# Patient Record
Sex: MALE | Race: WHITE | Employment: OTHER | URBAN - NONMETROPOLITAN AREA
[De-identification: names, ages, dates, MRNs, and addresses within clinical notes are randomized per-mention and may not be internally consistent; named-entity substitution may affect disease eponyms.]

---

## 2019-02-06 ENCOUNTER — OFFICE VISIT (OUTPATIENT)
Dept: FAMILY MEDICINE CLINIC | Age: 69
End: 2019-02-06
Payer: MEDICARE

## 2019-02-06 VITALS
HEIGHT: 74 IN | SYSTOLIC BLOOD PRESSURE: 164 MMHG | DIASTOLIC BLOOD PRESSURE: 118 MMHG | RESPIRATION RATE: 14 BRPM | WEIGHT: 247 LBS | BODY MASS INDEX: 31.7 KG/M2 | HEART RATE: 88 BPM | OXYGEN SATURATION: 98 %

## 2019-02-06 DIAGNOSIS — I10 ESSENTIAL HYPERTENSION: Primary | ICD-10-CM

## 2019-02-06 PROCEDURE — 1036F TOBACCO NON-USER: CPT | Performed by: FAMILY MEDICINE

## 2019-02-06 PROCEDURE — 99204 OFFICE O/P NEW MOD 45 MIN: CPT | Performed by: FAMILY MEDICINE

## 2019-02-06 PROCEDURE — 1123F ACP DISCUSS/DSCN MKR DOCD: CPT | Performed by: FAMILY MEDICINE

## 2019-02-06 PROCEDURE — G8484 FLU IMMUNIZE NO ADMIN: HCPCS | Performed by: FAMILY MEDICINE

## 2019-02-06 PROCEDURE — 4040F PNEUMOC VAC/ADMIN/RCVD: CPT | Performed by: FAMILY MEDICINE

## 2019-02-06 PROCEDURE — G8417 CALC BMI ABV UP PARAM F/U: HCPCS | Performed by: FAMILY MEDICINE

## 2019-02-06 PROCEDURE — 3017F COLORECTAL CA SCREEN DOC REV: CPT | Performed by: FAMILY MEDICINE

## 2019-02-06 PROCEDURE — 1101F PT FALLS ASSESS-DOCD LE1/YR: CPT | Performed by: FAMILY MEDICINE

## 2019-02-06 PROCEDURE — G8427 DOCREV CUR MEDS BY ELIG CLIN: HCPCS | Performed by: FAMILY MEDICINE

## 2019-02-06 RX ORDER — ASPIRIN 81 MG/1
81 TABLET ORAL DAILY
Qty: 30 TABLET | Refills: 5 | COMMUNITY
Start: 2019-02-06 | End: 2019-02-20 | Stop reason: ALTCHOICE

## 2019-02-06 RX ORDER — LISINOPRIL AND HYDROCHLOROTHIAZIDE 25; 20 MG/1; MG/1
1 TABLET ORAL DAILY
Qty: 30 TABLET | Refills: 1 | Status: SHIPPED | OUTPATIENT
Start: 2019-02-06 | End: 2019-02-20 | Stop reason: SDUPTHER

## 2019-02-06 ASSESSMENT — ENCOUNTER SYMPTOMS
WHEEZING: 0
SHORTNESS OF BREATH: 0
CONSTIPATION: 0
DIARRHEA: 0
SORE THROAT: 0
EYE DISCHARGE: 0
RHINORRHEA: 0
NAUSEA: 0
COUGH: 0

## 2019-02-06 ASSESSMENT — PATIENT HEALTH QUESTIONNAIRE - PHQ9
1. LITTLE INTEREST OR PLEASURE IN DOING THINGS: 0
SUM OF ALL RESPONSES TO PHQ9 QUESTIONS 1 & 2: 0
SUM OF ALL RESPONSES TO PHQ QUESTIONS 1-9: 0
2. FEELING DOWN, DEPRESSED OR HOPELESS: 0
SUM OF ALL RESPONSES TO PHQ QUESTIONS 1-9: 0

## 2019-02-07 ENCOUNTER — TELEPHONE (OUTPATIENT)
Dept: FAMILY MEDICINE CLINIC | Age: 69
End: 2019-02-07

## 2019-02-07 ENCOUNTER — HOSPITAL ENCOUNTER (EMERGENCY)
Age: 69
Discharge: HOME | End: 2019-02-08
Attending: FAMILY MEDICINE
Payer: MEDICARE

## 2019-02-07 DIAGNOSIS — N28.9 RENAL INSUFFICIENCY: ICD-10-CM

## 2019-02-07 DIAGNOSIS — I10 UNCONTROLLED HYPERTENSION: Primary | ICD-10-CM

## 2019-02-07 LAB
ALBUMIN SERPL-MCNC: 4.4 GM/DL (ref 3.4–5)
ALP BLD-CCNC: 75 U/L (ref 46–116)
ALT SERPL-CCNC: 21 U/L (ref 14–63)
ANION GAP: 14 MEQ/L (ref 8–16)
AST SERPL-CCNC: 13 U/L (ref 15–37)
BASOPHILS # BLD: 0.9 % (ref 0–3)
BILIRUB SERPL-MCNC: 0.9 MG/DL (ref 0.2–1)
BUN BLDV-MCNC: 20 MG/DL (ref 7–18)
CHLORIDE BLD-SCNC: 101 MEQ/L (ref 98–107)
CO2: 25 MEQ/L (ref 21–32)
CREAT SERPL-MCNC: 1.5 MG/DL (ref 0.6–1.3)
EKG ATRIAL RATE: 82 BPM
EKG P AXIS: 55 DEGREES
EKG P-R INTERVAL: 156 MS
EKG Q-T INTERVAL: 398 MS
EKG QRS DURATION: 104 MS
EKG QTC CALCULATION (BAZETT): 464 MS
EKG R AXIS: 38 DEGREES
EKG T AXIS: 29 DEGREES
EKG VENTRICULAR RATE: 82 BPM
EOSINOPHILS RELATIVE PERCENT: 2.8 % (ref 0–4)
GFR, ESTIMATED: 49 ML/MIN/1.73M2
GLUCOSE BLD-MCNC: 123 MG/DL (ref 74–106)
HCT VFR BLD CALC: 49.1 % (ref 42–52)
HEMOGLOBIN: 16.6 GM/DL (ref 14–18)
LYMPHOCYTES # BLD: 22.8 % (ref 15–47)
MCH RBC QN AUTO: 28.7 PG (ref 27–31)
MCHC RBC AUTO-ENTMCNC: 33.8 GM/DL (ref 33–37)
MCV RBC AUTO: 85 FL (ref 80–94)
MONOCYTES: 10.5 % (ref 0–12)
PDW BLD-RTO: 13.1 % (ref 11.5–14.5)
PLATELET # BLD: 167 THOU/MM3 (ref 130–400)
PMV BLD AUTO: 7.4 FL (ref 7.4–10.4)
POC CALCIUM: 9.9 MG/DL (ref 8.5–10.1)
POTASSIUM SERPL-SCNC: 3.3 MEQ/L (ref 3.5–5.1)
RBC # BLD: 5.78 MILL/MM3 (ref 4.7–6.1)
SEGS: 63 % (ref 43–75)
SODIUM BLD-SCNC: 140 MEQ/L (ref 136–145)
TOTAL PROTEIN: 7.8 GM/DL (ref 6.4–8.2)
TROPONIN I: < 0.017 NG/ML (ref 0.02–0.05)
WBC # BLD: 5 THOU/MM3 (ref 4.8–10.8)

## 2019-02-07 PROCEDURE — 2500000003 HC RX 250 WO HCPCS: Performed by: FAMILY MEDICINE

## 2019-02-07 PROCEDURE — 99283 EMERGENCY DEPT VISIT LOW MDM: CPT

## 2019-02-07 PROCEDURE — 96374 THER/PROPH/DIAG INJ IV PUSH: CPT

## 2019-02-07 PROCEDURE — 85025 COMPLETE CBC W/AUTO DIFF WBC: CPT

## 2019-02-07 PROCEDURE — 84484 ASSAY OF TROPONIN QUANT: CPT

## 2019-02-07 PROCEDURE — 36415 COLL VENOUS BLD VENIPUNCTURE: CPT

## 2019-02-07 PROCEDURE — 93005 ELECTROCARDIOGRAM TRACING: CPT | Performed by: FAMILY MEDICINE

## 2019-02-07 PROCEDURE — 80053 COMPREHEN METABOLIC PANEL: CPT

## 2019-02-07 PROCEDURE — 2709999900 HC NON-CHARGEABLE SUPPLY

## 2019-02-07 RX ORDER — LABETALOL HYDROCHLORIDE 5 MG/ML
10 INJECTION, SOLUTION INTRAVENOUS ONCE
Status: COMPLETED | OUTPATIENT
Start: 2019-02-07 | End: 2019-02-07

## 2019-02-07 RX ADMIN — LABETALOL HYDROCHLORIDE 10 MG: 5 INJECTION INTRAVENOUS at 23:11

## 2019-02-08 VITALS
SYSTOLIC BLOOD PRESSURE: 202 MMHG | WEIGHT: 245 LBS | HEIGHT: 73 IN | OXYGEN SATURATION: 97 % | BODY MASS INDEX: 32.47 KG/M2 | TEMPERATURE: 98 F | RESPIRATION RATE: 22 BRPM | DIASTOLIC BLOOD PRESSURE: 109 MMHG | HEART RATE: 71 BPM

## 2019-02-08 PROCEDURE — 93010 ELECTROCARDIOGRAM REPORT: CPT | Performed by: INTERNAL MEDICINE

## 2019-02-08 ASSESSMENT — ENCOUNTER SYMPTOMS
NAUSEA: 0
CONSTIPATION: 0
ABDOMINAL PAIN: 0
DIARRHEA: 0
SINUS PRESSURE: 0
ABDOMINAL DISTENTION: 0
EYE DISCHARGE: 0
COUGH: 0
CHEST TIGHTNESS: 0
SHORTNESS OF BREATH: 0
WHEEZING: 0
EYE PAIN: 0
VOMITING: 0
STRIDOR: 0
PHOTOPHOBIA: 0
BACK PAIN: 0
SORE THROAT: 0
EYE REDNESS: 0
RHINORRHEA: 0

## 2019-02-11 ENCOUNTER — TELEPHONE (OUTPATIENT)
Dept: FAMILY MEDICINE CLINIC | Age: 69
End: 2019-02-11

## 2019-02-20 ENCOUNTER — OFFICE VISIT (OUTPATIENT)
Dept: FAMILY MEDICINE CLINIC | Age: 69
End: 2019-02-20
Payer: MEDICARE

## 2019-02-20 ENCOUNTER — TELEPHONE (OUTPATIENT)
Dept: FAMILY MEDICINE CLINIC | Age: 69
End: 2019-02-20

## 2019-02-20 VITALS
HEART RATE: 82 BPM | BODY MASS INDEX: 31 KG/M2 | RESPIRATION RATE: 16 BRPM | DIASTOLIC BLOOD PRESSURE: 92 MMHG | SYSTOLIC BLOOD PRESSURE: 168 MMHG | OXYGEN SATURATION: 95 % | WEIGHT: 235 LBS

## 2019-02-20 DIAGNOSIS — R06.02 SHORTNESS OF BREATH: ICD-10-CM

## 2019-02-20 DIAGNOSIS — I10 UNCONTROLLED HYPERTENSION: Primary | ICD-10-CM

## 2019-02-20 PROCEDURE — 4040F PNEUMOC VAC/ADMIN/RCVD: CPT | Performed by: FAMILY MEDICINE

## 2019-02-20 PROCEDURE — 3017F COLORECTAL CA SCREEN DOC REV: CPT | Performed by: FAMILY MEDICINE

## 2019-02-20 PROCEDURE — 99214 OFFICE O/P EST MOD 30 MIN: CPT | Performed by: FAMILY MEDICINE

## 2019-02-20 PROCEDURE — 1101F PT FALLS ASSESS-DOCD LE1/YR: CPT | Performed by: FAMILY MEDICINE

## 2019-02-20 PROCEDURE — G8484 FLU IMMUNIZE NO ADMIN: HCPCS | Performed by: FAMILY MEDICINE

## 2019-02-20 PROCEDURE — 1036F TOBACCO NON-USER: CPT | Performed by: FAMILY MEDICINE

## 2019-02-20 PROCEDURE — G8417 CALC BMI ABV UP PARAM F/U: HCPCS | Performed by: FAMILY MEDICINE

## 2019-02-20 PROCEDURE — G8427 DOCREV CUR MEDS BY ELIG CLIN: HCPCS | Performed by: FAMILY MEDICINE

## 2019-02-20 PROCEDURE — 1123F ACP DISCUSS/DSCN MKR DOCD: CPT | Performed by: FAMILY MEDICINE

## 2019-02-20 RX ORDER — AMLODIPINE BESYLATE 10 MG/1
10 TABLET ORAL DAILY
Qty: 90 TABLET | Refills: 1 | Status: SHIPPED | OUTPATIENT
Start: 2019-02-20 | End: 2020-03-04

## 2019-02-20 RX ORDER — LISINOPRIL AND HYDROCHLOROTHIAZIDE 25; 20 MG/1; MG/1
1 TABLET ORAL 2 TIMES DAILY
Qty: 60 TABLET | Refills: 3 | Status: SHIPPED | OUTPATIENT
Start: 2019-02-20 | End: 2019-06-24 | Stop reason: SDUPTHER

## 2019-02-20 ASSESSMENT — ENCOUNTER SYMPTOMS
NAUSEA: 0
ORTHOPNEA: 0
BACK PAIN: 1
SHORTNESS OF BREATH: 1
WHEEZING: 0
ABDOMINAL PAIN: 0
SORE THROAT: 0
RHINORRHEA: 0
CHEST TIGHTNESS: 1
CONSTIPATION: 0
BLURRED VISION: 0
DIARRHEA: 0
EYE DISCHARGE: 0
COUGH: 0

## 2019-03-05 ENCOUNTER — HOSPITAL ENCOUNTER (OUTPATIENT)
Dept: NON INVASIVE DIAGNOSTICS | Age: 69
Discharge: HOME | End: 2019-03-05
Payer: MEDICARE

## 2019-03-05 DIAGNOSIS — R06.02 SHORTNESS OF BREATH: ICD-10-CM

## 2019-03-05 LAB
LV EF: 60 %
LVEF MODALITY: NORMAL

## 2019-03-05 PROCEDURE — 93306 TTE W/DOPPLER COMPLETE: CPT

## 2019-03-07 ENCOUNTER — OFFICE VISIT (OUTPATIENT)
Dept: FAMILY MEDICINE CLINIC | Age: 69
End: 2019-03-07
Payer: MEDICARE

## 2019-03-07 VITALS
DIASTOLIC BLOOD PRESSURE: 80 MMHG | SYSTOLIC BLOOD PRESSURE: 128 MMHG | RESPIRATION RATE: 12 BRPM | WEIGHT: 233.4 LBS | BODY MASS INDEX: 30.79 KG/M2 | HEART RATE: 79 BPM | OXYGEN SATURATION: 98 %

## 2019-03-07 DIAGNOSIS — I10 ESSENTIAL HYPERTENSION: Primary | ICD-10-CM

## 2019-03-07 DIAGNOSIS — H61.23 BILATERAL IMPACTED CERUMEN: ICD-10-CM

## 2019-03-07 PROCEDURE — 99214 OFFICE O/P EST MOD 30 MIN: CPT | Performed by: FAMILY MEDICINE

## 2019-03-07 PROCEDURE — 4040F PNEUMOC VAC/ADMIN/RCVD: CPT | Performed by: FAMILY MEDICINE

## 2019-03-07 PROCEDURE — G8427 DOCREV CUR MEDS BY ELIG CLIN: HCPCS | Performed by: FAMILY MEDICINE

## 2019-03-07 PROCEDURE — 3017F COLORECTAL CA SCREEN DOC REV: CPT | Performed by: FAMILY MEDICINE

## 2019-03-07 PROCEDURE — 69210 REMOVE IMPACTED EAR WAX UNI: CPT | Performed by: FAMILY MEDICINE

## 2019-03-07 PROCEDURE — 1101F PT FALLS ASSESS-DOCD LE1/YR: CPT | Performed by: FAMILY MEDICINE

## 2019-03-07 PROCEDURE — G8484 FLU IMMUNIZE NO ADMIN: HCPCS | Performed by: FAMILY MEDICINE

## 2019-03-07 PROCEDURE — 1123F ACP DISCUSS/DSCN MKR DOCD: CPT | Performed by: FAMILY MEDICINE

## 2019-03-07 PROCEDURE — 1036F TOBACCO NON-USER: CPT | Performed by: FAMILY MEDICINE

## 2019-03-07 PROCEDURE — G8417 CALC BMI ABV UP PARAM F/U: HCPCS | Performed by: FAMILY MEDICINE

## 2019-03-07 ASSESSMENT — ENCOUNTER SYMPTOMS
RHINORRHEA: 0
SORE THROAT: 0
BLURRED VISION: 0
COUGH: 1
ORTHOPNEA: 0
CONSTIPATION: 0
DIARRHEA: 0
ABDOMINAL PAIN: 0
NAUSEA: 0
WHEEZING: 0
SHORTNESS OF BREATH: 0
EYE DISCHARGE: 0

## 2019-03-14 ENCOUNTER — HOSPITAL ENCOUNTER (OUTPATIENT)
Dept: NON INVASIVE DIAGNOSTICS | Age: 69
Discharge: HOME | End: 2019-03-14
Payer: MEDICARE

## 2019-03-14 VITALS — HEIGHT: 73 IN | WEIGHT: 234 LBS | BODY MASS INDEX: 31.01 KG/M2

## 2019-03-14 DIAGNOSIS — R06.02 SHORTNESS OF BREATH: ICD-10-CM

## 2019-03-14 DIAGNOSIS — I10 UNCONTROLLED HYPERTENSION: ICD-10-CM

## 2019-03-14 LAB
LV EF: 54 %
LVEF MODALITY: NORMAL

## 2019-03-14 PROCEDURE — 78452 HT MUSCLE IMAGE SPECT MULT: CPT

## 2019-03-14 PROCEDURE — A9500 TC99M SESTAMIBI: HCPCS | Performed by: NUCLEAR MEDICINE

## 2019-03-14 PROCEDURE — 93017 CV STRESS TEST TRACING ONLY: CPT

## 2019-03-14 PROCEDURE — 2709999900 HC NON-CHARGEABLE SUPPLY

## 2019-03-14 PROCEDURE — 3430000000 HC RX DIAGNOSTIC RADIOPHARMACEUTICAL: Performed by: NUCLEAR MEDICINE

## 2019-03-14 RX ADMIN — Medication 29.2 MILLICURIE: at 12:00

## 2019-03-14 RX ADMIN — Medication 8.7 MILLICURIE: at 10:39

## 2019-06-24 DIAGNOSIS — I10 UNCONTROLLED HYPERTENSION: ICD-10-CM

## 2019-06-24 RX ORDER — LISINOPRIL AND HYDROCHLOROTHIAZIDE 25; 20 MG/1; MG/1
1 TABLET ORAL 2 TIMES DAILY
Qty: 60 TABLET | Refills: 3 | Status: SHIPPED | OUTPATIENT
Start: 2019-06-24 | End: 2019-08-28

## 2019-08-28 ENCOUNTER — OFFICE VISIT (OUTPATIENT)
Dept: FAMILY MEDICINE CLINIC | Age: 69
End: 2019-08-28
Payer: MEDICARE

## 2019-08-28 VITALS
HEIGHT: 73 IN | HEART RATE: 74 BPM | OXYGEN SATURATION: 96 % | BODY MASS INDEX: 32.07 KG/M2 | RESPIRATION RATE: 16 BRPM | SYSTOLIC BLOOD PRESSURE: 132 MMHG | DIASTOLIC BLOOD PRESSURE: 78 MMHG | WEIGHT: 242 LBS

## 2019-08-28 DIAGNOSIS — I10 ESSENTIAL HYPERTENSION: ICD-10-CM

## 2019-08-28 DIAGNOSIS — Z00.00 ROUTINE GENERAL MEDICAL EXAMINATION AT A HEALTH CARE FACILITY: Primary | ICD-10-CM

## 2019-08-28 PROCEDURE — 4040F PNEUMOC VAC/ADMIN/RCVD: CPT | Performed by: FAMILY MEDICINE

## 2019-08-28 PROCEDURE — 3017F COLORECTAL CA SCREEN DOC REV: CPT | Performed by: FAMILY MEDICINE

## 2019-08-28 PROCEDURE — 1123F ACP DISCUSS/DSCN MKR DOCD: CPT | Performed by: FAMILY MEDICINE

## 2019-08-28 PROCEDURE — G0438 PPPS, INITIAL VISIT: HCPCS | Performed by: FAMILY MEDICINE

## 2019-08-28 RX ORDER — LOSARTAN POTASSIUM AND HYDROCHLOROTHIAZIDE 25; 100 MG/1; MG/1
1 TABLET ORAL DAILY
Qty: 30 TABLET | Refills: 3 | Status: SHIPPED | OUTPATIENT
Start: 2019-08-28 | End: 2020-03-04

## 2019-08-28 ASSESSMENT — LIFESTYLE VARIABLES
HOW OFTEN DURING THE LAST YEAR HAVE YOU FOUND THAT YOU WERE NOT ABLE TO STOP DRINKING ONCE YOU HAD STARTED: 0
HOW OFTEN DURING THE LAST YEAR HAVE YOU NEEDED AN ALCOHOLIC DRINK FIRST THING IN THE MORNING TO GET YOURSELF GOING AFTER A NIGHT OF HEAVY DRINKING: 0
HAVE YOU OR SOMEONE ELSE BEEN INJURED AS A RESULT OF YOUR DRINKING: 0
HOW OFTEN DO YOU HAVE A DRINK CONTAINING ALCOHOL: 3
HOW OFTEN DURING THE LAST YEAR HAVE YOU FAILED TO DO WHAT WAS NORMALLY EXPECTED FROM YOU BECAUSE OF DRINKING: 0
HOW OFTEN DO YOU HAVE SIX OR MORE DRINKS ON ONE OCCASION: 0
HAS A RELATIVE, FRIEND, DOCTOR, OR ANOTHER HEALTH PROFESSIONAL EXPRESSED CONCERN ABOUT YOUR DRINKING OR SUGGESTED YOU CUT DOWN: 0
AUDIT-C TOTAL SCORE: 3
HOW OFTEN DURING THE LAST YEAR HAVE YOU HAD A FEELING OF GUILT OR REMORSE AFTER DRINKING: 0
HOW MANY STANDARD DRINKS CONTAINING ALCOHOL DO YOU HAVE ON A TYPICAL DAY: 0
AUDIT TOTAL SCORE: 3
HOW OFTEN DURING THE LAST YEAR HAVE YOU BEEN UNABLE TO REMEMBER WHAT HAPPENED THE NIGHT BEFORE BECAUSE YOU HAD BEEN DRINKING: 0

## 2019-08-28 ASSESSMENT — PATIENT HEALTH QUESTIONNAIRE - PHQ9
SUM OF ALL RESPONSES TO PHQ QUESTIONS 1-9: 0
SUM OF ALL RESPONSES TO PHQ QUESTIONS 1-9: 0

## 2019-08-28 NOTE — PROGRESS NOTES
auscultation bilaterally- no wheezes, rales or rhonchi, normal air movement, no respiratory distress  Cardiovascular: normal rate, normal S1 and S2, no gallops and intact distal pulses    Patient's complete Health Risk Assessment and screening values have been reviewed and are found in Flowsheets. The following problems were reviewed today and where indicated follow up appointments were made and/or referrals ordered. Positive Risk Factor Screenings with Interventions:     Cognitive: Words recalled: 2 Words Recalled  Total Score Interpretation: Positive Mini-Cog  Cognitive Impairment Interventions:  · Patient declines any further evaluation/treatment for cognitive impairment    Health Habits/Nutrition:  Health Habits/Nutrition  Do you exercise for at least 20 minutes 2-3 times per week?: (!) No  Have you lost any weight without trying in the past 3 months?: No  Do you eat fewer than 2 meals per day?: No  Have you seen a dentist within the past year?: Yes  Body mass index is 31.93 kg/m². Health Habits/Nutrition Interventions:  · Inadequate physical activity:  educational materials provided to promote increased physical activity    Safety:  Safety  Do you have working smoke detectors?: Yes  Have all throw rugs been removed or fastened?: (!) No  Do you have non-slip mats or surfaces in all bathtubs/showers?: (!) No  Do all of your stairways have a railing or banister?: Yes  Are your doorways, halls and stairs free of clutter?: Yes  Do you always fasten your seatbelt when you are in a car?: Yes  Safety Interventions:  · Patient declines any further evaluation/treatment for this issue    Personalized Preventive Plan   Current Health Maintenance Status    There is no immunization history on file for this patient.      Health Maintenance   Topic Date Due    Hepatitis C screen  1950    DTaP/Tdap/Td vaccine (1 - Tdap) 08/05/1969    Lipid screen  08/05/1990    Diabetes screen  08/05/1990    Shingles Vaccine (1 of 2) 08/05/2000    Colon cancer screen colonoscopy  08/05/2000    Annual Wellness Visit (AWV)  08/05/2013    Pneumococcal 65+ years Vaccine (1 of 2 - PCV13) 08/05/2015    Flu vaccine (1) 09/01/2019    Potassium monitoring  02/07/2020    Creatinine monitoring  02/07/2020     Recommendations for Preventive Services Due: see orders and patient instructions/AVS.  . Recommended screening schedule for the next 5-10 years is provided to the patient in written form: see Patient Instructions/AVS.    Matha Merlin was seen today for medicare awv, cough and other. Diagnoses and all orders for this visit:    Routine general medical examination at a health care facility    Essential hypertension  -     losartan-hydrochlorothiazide (HYZAAR) 100-25 MG per tablet; Take 1 tablet by mouth daily        will change lisinopril/hctz to losartan hctz to help relieve cough  bp controlled  Patient delcines labs, and screening recommendations at this time will consider health [facility] labs.

## 2019-12-04 DIAGNOSIS — I10 UNCONTROLLED HYPERTENSION: ICD-10-CM

## 2019-12-05 RX ORDER — LISINOPRIL AND HYDROCHLOROTHIAZIDE 25; 20 MG/1; MG/1
TABLET ORAL
Qty: 240 TABLET | Refills: 0 | Status: SHIPPED | OUTPATIENT
Start: 2019-12-05 | End: 2020-03-04 | Stop reason: SDUPTHER

## 2020-03-04 ENCOUNTER — OFFICE VISIT (OUTPATIENT)
Dept: FAMILY MEDICINE CLINIC | Age: 70
End: 2020-03-04
Payer: MEDICARE

## 2020-03-04 VITALS
DIASTOLIC BLOOD PRESSURE: 78 MMHG | RESPIRATION RATE: 16 BRPM | OXYGEN SATURATION: 95 % | BODY MASS INDEX: 31.4 KG/M2 | WEIGHT: 238 LBS | SYSTOLIC BLOOD PRESSURE: 128 MMHG | HEART RATE: 65 BPM

## 2020-03-04 PROCEDURE — 99214 OFFICE O/P EST MOD 30 MIN: CPT | Performed by: FAMILY MEDICINE

## 2020-03-04 PROCEDURE — G8427 DOCREV CUR MEDS BY ELIG CLIN: HCPCS | Performed by: FAMILY MEDICINE

## 2020-03-04 PROCEDURE — 3017F COLORECTAL CA SCREEN DOC REV: CPT | Performed by: FAMILY MEDICINE

## 2020-03-04 PROCEDURE — 4040F PNEUMOC VAC/ADMIN/RCVD: CPT | Performed by: FAMILY MEDICINE

## 2020-03-04 PROCEDURE — 1123F ACP DISCUSS/DSCN MKR DOCD: CPT | Performed by: FAMILY MEDICINE

## 2020-03-04 PROCEDURE — 1036F TOBACCO NON-USER: CPT | Performed by: FAMILY MEDICINE

## 2020-03-04 PROCEDURE — G8417 CALC BMI ABV UP PARAM F/U: HCPCS | Performed by: FAMILY MEDICINE

## 2020-03-04 PROCEDURE — G8484 FLU IMMUNIZE NO ADMIN: HCPCS | Performed by: FAMILY MEDICINE

## 2020-03-04 RX ORDER — LISINOPRIL AND HYDROCHLOROTHIAZIDE 25; 20 MG/1; MG/1
TABLET ORAL
Qty: 180 TABLET | Refills: 3 | Status: SHIPPED | OUTPATIENT
Start: 2020-03-04 | End: 2021-06-14 | Stop reason: SDUPTHER

## 2020-03-04 SDOH — ECONOMIC STABILITY: INCOME INSECURITY: HOW HARD IS IT FOR YOU TO PAY FOR THE VERY BASICS LIKE FOOD, HOUSING, MEDICAL CARE, AND HEATING?: NOT HARD AT ALL

## 2020-03-04 SDOH — ECONOMIC STABILITY: FOOD INSECURITY: WITHIN THE PAST 12 MONTHS, YOU WORRIED THAT YOUR FOOD WOULD RUN OUT BEFORE YOU GOT MONEY TO BUY MORE.: NEVER TRUE

## 2020-03-04 SDOH — ECONOMIC STABILITY: TRANSPORTATION INSECURITY
IN THE PAST 12 MONTHS, HAS LACK OF TRANSPORTATION KEPT YOU FROM MEETINGS, WORK, OR FROM GETTING THINGS NEEDED FOR DAILY LIVING?: NO

## 2020-03-04 SDOH — ECONOMIC STABILITY: FOOD INSECURITY: WITHIN THE PAST 12 MONTHS, THE FOOD YOU BOUGHT JUST DIDN'T LAST AND YOU DIDN'T HAVE MONEY TO GET MORE.: NEVER TRUE

## 2020-03-04 SDOH — ECONOMIC STABILITY: TRANSPORTATION INSECURITY
IN THE PAST 12 MONTHS, HAS THE LACK OF TRANSPORTATION KEPT YOU FROM MEDICAL APPOINTMENTS OR FROM GETTING MEDICATIONS?: NO

## 2020-03-04 ASSESSMENT — ENCOUNTER SYMPTOMS
BLURRED VISION: 0
ABDOMINAL PAIN: 0
WHEEZING: 0
SHORTNESS OF BREATH: 0
SORE THROAT: 0
DIARRHEA: 0
ORTHOPNEA: 0
RHINORRHEA: 0
EYE DISCHARGE: 0
COUGH: 1
CONSTIPATION: 0
NAUSEA: 0

## 2020-03-04 ASSESSMENT — PATIENT HEALTH QUESTIONNAIRE - PHQ9
2. FEELING DOWN, DEPRESSED OR HOPELESS: 0
SUM OF ALL RESPONSES TO PHQ9 QUESTIONS 1 & 2: 0
1. LITTLE INTEREST OR PLEASURE IN DOING THINGS: 0
SUM OF ALL RESPONSES TO PHQ QUESTIONS 1-9: 0
SUM OF ALL RESPONSES TO PHQ QUESTIONS 1-9: 0

## 2020-03-04 NOTE — PROGRESS NOTES
[address] [place] [address] DR. Giordano [place] [address]  Dept: [phone]  Dept Fax: [phone]  Loc: [phone]    Fabienne Tyler is a 71 y.o. male who presents today for his medical conditions/complaints as noted below. Chief Complaint   Patient presents with    6 Month Follow-Up    Hypertension           HPI:     Hypertension   This is a chronic problem. The current episode started more than 1 year ago. The problem is unchanged. The problem is controlled. Pertinent negatives include no anxiety, blurred vision, chest pain, headaches, malaise/fatigue, neck pain, orthopnea, palpitations, peripheral edema, shortness of breath or sweats. There are no associated agents to hypertension. Risk factors for coronary artery disease include male gender. Past treatments include ACE inhibitors and diuretics. The current treatment provides moderate improvement. There are no compliance problems. There is no history of angina, kidney disease or CAD/MI. There is no history of hyperaldosteronism, hypercortisolism, hyperparathyroidism, a hypertension causing med or a thyroid problem. Past Medical History:   Diagnosis Date    Hypertension       Past Surgical History:   Procedure Laterality Date    SHOULDER SURGERY         Family History   Problem Relation Age of Onset    Heart Disease Mother        Social History     Tobacco Use    Smoking status: Never Smoker    Smokeless tobacco: Never Used   Substance Use Topics    Alcohol use: Yes     Comment: occassionally      Current Outpatient Medications   Medication Sig Dispense Refill    lisinopril-hydroCHLOROthiazide (PRINZIDE;ZESTORETIC) 20-25 MG per tablet take 1 tablet by mouth twice a day 180 tablet 3     No current facility-administered medications for this visit.       No Known Allergies    Health Maintenance   Topic Date Due    Hepatitis C screen  1950    DTaP/Tdap/Td vaccine (1 - Eyes:      General: No scleral icterus. Right eye: No discharge. Left eye: No discharge. Conjunctiva/sclera: Conjunctivae normal.      Pupils: Pupils are equal, round, and reactive to light. Neck:      Musculoskeletal: Normal range of motion and neck supple. Cardiovascular:      Rate and Rhythm: Normal rate and regular rhythm. Heart sounds: Normal heart sounds. Pulmonary:      Effort: Pulmonary effort is normal. No respiratory distress. Breath sounds: Normal breath sounds. No wheezing. Abdominal:      General: Bowel sounds are normal. There is no distension. Palpations: Abdomen is soft. Tenderness: There is no abdominal tenderness. Musculoskeletal: Normal range of motion. General: No tenderness. Lymphadenopathy:      Cervical: No cervical adenopathy. Skin:     General: Skin is warm and dry. Findings: No rash. Neurological:      Mental Status: He is alert and oriented to person, place, and time. Motor: No abnormal muscle tone. Coordination: Coordination normal.   Psychiatric:         Behavior: Behavior normal.         Thought Content: Thought content normal.         Judgment: Judgment normal.       /78 (Site: Left Upper Arm, Position: Sitting, Cuff Size: Medium Adult)   Pulse 65   Resp 16   Wt 238 lb (108 kg)   SpO2 95%   BMI 31.40 kg/m²     Assessment:       Diagnosis Orders   1. Essential hypertension         Plan:   Blood pressure controlled  Recommend fasting labs. Will consider doing them at [facility], or will come fasting to next appt in 6 months. Patient is hesistant as he doesn't like needles  Discussed use, benefit, and side effects of prescribed medications. Barriers tomedication compliance addressed. All patient questions answered. Pt voiced understanding. Return for fasting labs at appt, HTN. No orders of the defined types were placed in this encounter.     Orders Placed This Encounter   Medications    lisinopril-hydroCHLOROthiazide (PRINZIDE;ZESTORETIC) 20-25 MG per tablet     Sig: take 1 tablet by mouth twice a day     Dispense:  180 tablet     Refill:  3        Reccommended tobacco cessation options including pharmacologicmethods, counseled great than 3 minutes during this visit:  Yes  []  No  []     Patient given educational materials - see patient instructions. Discussed use, benefit, and sideeffects of prescribed medications. All patient questions answered. Pt voiced understanding. Reviewed health maintenance. Instructed to continue current medications, diet and exercise. Patient agreed with treatment plan. Follow up as directed.      Electronically signed by Ralf Mario MD on 3/4/2020 at 3:06 PM

## 2020-09-01 ENCOUNTER — OFFICE VISIT (OUTPATIENT)
Dept: FAMILY MEDICINE CLINIC | Age: 70
End: 2020-09-01
Payer: MEDICARE

## 2020-09-01 VITALS
HEIGHT: 73 IN | DIASTOLIC BLOOD PRESSURE: 82 MMHG | BODY MASS INDEX: 32.2 KG/M2 | HEART RATE: 72 BPM | OXYGEN SATURATION: 94 % | SYSTOLIC BLOOD PRESSURE: 138 MMHG | WEIGHT: 243 LBS | TEMPERATURE: 97.7 F | RESPIRATION RATE: 16 BRPM

## 2020-09-01 LAB
ALBUMIN SERPL-MCNC: 4.5 G/DL (ref 3.5–5.1)
ALP BLD-CCNC: 57 U/L (ref 38–126)
ALT SERPL-CCNC: 16 U/L (ref 11–66)
ANION GAP SERPL CALCULATED.3IONS-SCNC: 11 MEQ/L (ref 8–16)
AST SERPL-CCNC: 17 U/L (ref 5–40)
BASOPHILS # BLD: 0.6 %
BASOPHILS ABSOLUTE: 0 THOU/MM3 (ref 0–0.1)
BILIRUB SERPL-MCNC: 0.6 MG/DL (ref 0.3–1.2)
BUN BLDV-MCNC: 24 MG/DL (ref 7–22)
CALCIUM SERPL-MCNC: 10.1 MG/DL (ref 8.5–10.5)
CHLORIDE BLD-SCNC: 103 MEQ/L (ref 98–111)
CHOLESTEROL, TOTAL: 201 MG/DL (ref 100–199)
CO2: 28 MEQ/L (ref 23–33)
CREAT SERPL-MCNC: 1.4 MG/DL (ref 0.4–1.2)
EOSINOPHIL # BLD: 2.8 %
EOSINOPHILS ABSOLUTE: 0.1 THOU/MM3 (ref 0–0.4)
ERYTHROCYTE [DISTWIDTH] IN BLOOD BY AUTOMATED COUNT: 13 % (ref 11.5–14.5)
ERYTHROCYTE [DISTWIDTH] IN BLOOD BY AUTOMATED COUNT: 42.3 FL (ref 35–45)
GFR SERPL CREATININE-BSD FRML MDRD: 50 ML/MIN/1.73M2
GLUCOSE BLD-MCNC: 120 MG/DL (ref 70–108)
HCT VFR BLD CALC: 47.6 % (ref 42–52)
HDLC SERPL-MCNC: 45 MG/DL
HEMOGLOBIN: 15.6 GM/DL (ref 14–18)
IMMATURE GRANS (ABS): 0.01 THOU/MM3 (ref 0–0.07)
IMMATURE GRANULOCYTES: 0.2 %
LDL CHOLESTEROL CALCULATED: 141 MG/DL
LYMPHOCYTES # BLD: 21.2 %
LYMPHOCYTES ABSOLUTE: 1 THOU/MM3 (ref 1–4.8)
MCH RBC QN AUTO: 29.3 PG (ref 26–33)
MCHC RBC AUTO-ENTMCNC: 32.8 GM/DL (ref 32.2–35.5)
MCV RBC AUTO: 89.3 FL (ref 80–94)
MONOCYTES # BLD: 11.2 %
MONOCYTES ABSOLUTE: 0.5 THOU/MM3 (ref 0.4–1.3)
NUCLEATED RED BLOOD CELLS: 0 /100 WBC
PLATELET # BLD: 163 THOU/MM3 (ref 130–400)
PMV BLD AUTO: 11 FL (ref 9.4–12.4)
POTASSIUM SERPL-SCNC: 5 MEQ/L (ref 3.5–5.2)
RBC # BLD: 5.33 MILL/MM3 (ref 4.7–6.1)
SEG NEUTROPHILS: 64 %
SEGMENTED NEUTROPHILS ABSOLUTE COUNT: 2.9 THOU/MM3 (ref 1.8–7.7)
SODIUM BLD-SCNC: 142 MEQ/L (ref 135–145)
TOTAL PROTEIN: 7.3 G/DL (ref 6.1–8)
TRIGL SERPL-MCNC: 77 MG/DL (ref 0–199)
WBC # BLD: 4.6 THOU/MM3 (ref 4.8–10.8)

## 2020-09-01 PROCEDURE — G8427 DOCREV CUR MEDS BY ELIG CLIN: HCPCS | Performed by: FAMILY MEDICINE

## 2020-09-01 PROCEDURE — 1036F TOBACCO NON-USER: CPT | Performed by: FAMILY MEDICINE

## 2020-09-01 PROCEDURE — 99213 OFFICE O/P EST LOW 20 MIN: CPT | Performed by: FAMILY MEDICINE

## 2020-09-01 PROCEDURE — 36415 COLL VENOUS BLD VENIPUNCTURE: CPT | Performed by: FAMILY MEDICINE

## 2020-09-01 PROCEDURE — G8417 CALC BMI ABV UP PARAM F/U: HCPCS | Performed by: FAMILY MEDICINE

## 2020-09-01 PROCEDURE — 1123F ACP DISCUSS/DSCN MKR DOCD: CPT | Performed by: FAMILY MEDICINE

## 2020-09-01 PROCEDURE — 3017F COLORECTAL CA SCREEN DOC REV: CPT | Performed by: FAMILY MEDICINE

## 2020-09-01 PROCEDURE — 4040F PNEUMOC VAC/ADMIN/RCVD: CPT | Performed by: FAMILY MEDICINE

## 2020-09-01 ASSESSMENT — PATIENT HEALTH QUESTIONNAIRE - PHQ9
SUM OF ALL RESPONSES TO PHQ9 QUESTIONS 1 & 2: 0
SUM OF ALL RESPONSES TO PHQ QUESTIONS 1-9: 0
SUM OF ALL RESPONSES TO PHQ QUESTIONS 1-9: 0
1. LITTLE INTEREST OR PLEASURE IN DOING THINGS: 0
2. FEELING DOWN, DEPRESSED OR HOPELESS: 0

## 2020-09-01 ASSESSMENT — ENCOUNTER SYMPTOMS
SHORTNESS OF BREATH: 0
WHEEZING: 0
CONSTIPATION: 0
BACK PAIN: 1
RHINORRHEA: 0
ABDOMINAL PAIN: 0
EYE DISCHARGE: 0
SORE THROAT: 0
NAUSEA: 0
DIARRHEA: 0
COUGH: 0

## 2020-09-01 NOTE — PROGRESS NOTES
Venipuncture obtained from  left arm. Patient tolerated the procedure without complications or complaints. Patient by was 138/82   He is here for a 6 month follow up on HTN.

## 2020-09-01 NOTE — PROGRESS NOTES
 Shingles Vaccine (1 of 2) 08/05/2000    Colon cancer screen colonoscopy  08/05/2000    Pneumococcal 65+ years Vaccine (1 of 1 - PPSV23) 08/05/2015    Annual Wellness Visit (AWV)  05/29/2019    Potassium monitoring  02/07/2020    Creatinine monitoring  02/07/2020    Flu vaccine (1) 09/01/2020    Hepatitis A vaccine  Aged Out    Hepatitis B vaccine  Aged Out    Hib vaccine  Aged Out    Meningococcal (ACWY) vaccine  Aged Out       Subjective:      Review of Systems   Constitutional: Negative for chills, fatigue and fever. HENT: Negative for congestion, rhinorrhea and sore throat. Eyes: Negative for discharge and visual disturbance. Respiratory: Negative for cough, shortness of breath and wheezing. Cardiovascular: Negative for chest pain and palpitations. Gastrointestinal: Negative for abdominal pain, constipation, diarrhea and nausea. Genitourinary: Negative for dysuria and hematuria. Musculoskeletal: Positive for arthralgias, back pain and myalgias. Neurological: Negative for dizziness and headaches. Psychiatric/Behavioral: Negative for sleep disturbance. The patient is not nervous/anxious. Objective:     Physical Exam  Vitals signs and nursing note reviewed. Constitutional:       Appearance: He is well-developed. HENT:      Head: Normocephalic and atraumatic. Eyes:      General: No scleral icterus. Right eye: No discharge. Left eye: No discharge. Conjunctiva/sclera: Conjunctivae normal.   Neck:      Musculoskeletal: Normal range of motion. Cardiovascular:      Rate and Rhythm: Normal rate and regular rhythm. Heart sounds: Normal heart sounds. Pulmonary:      Effort: Pulmonary effort is normal.      Breath sounds: Normal breath sounds. No wheezing. Abdominal:      General: Bowel sounds are normal. There is no distension. Palpations: Abdomen is soft. Tenderness: There is no abdominal tenderness.    Musculoskeletal:      Cervical back: He exhibits tenderness, pain and spasm. Lymphadenopathy:      Cervical: No cervical adenopathy. Skin:     General: Skin is warm and dry. Neurological:      Mental Status: He is alert and oriented to person, place, and time. Coordination: Coordination normal.   Psychiatric:         Behavior: Behavior normal.         Thought Content: Thought content normal.         Judgment: Judgment normal.       /82 (Site: Left Upper Arm, Position: Sitting, Cuff Size: Large Adult)   Pulse 72   Temp 97.7 °F (36.5 °C) (Temporal)   Resp 16   Ht 6' 1\" (1.854 m)   Wt 243 lb (110.2 kg)   SpO2 94%   BMI 32.06 kg/m²     Assessment:       Diagnosis Orders   1. Essential hypertension  Comprehensive Metabolic Panel    CBC Auto Differential    CBC Auto Differential    Comprehensive Metabolic Panel   2. Screening for lipid disorders  Lipid Panel    Lipid Panel       Plan:   Cont meds for bp, chronic and controlled. Fasting labs ordered. Declines vaccines/ colon cancer screening. Discussed use, benefit, and side effects of prescribed medications. Barriers tomedication compliance addressed. All patient questions answered. Pt voiced understanding. No follow-ups on file. Orders Placed This Encounter   Procedures    Comprehensive Metabolic Panel     Standing Status:   Future     Number of Occurrences:   1     Standing Expiration Date:   9/1/2021    CBC Auto Differential     Standing Status:   Future     Number of Occurrences:   1     Standing Expiration Date:   9/1/2021    Lipid Panel     Standing Status:   Future     Number of Occurrences:   1     Standing Expiration Date:   9/1/2021     Order Specific Question:   Is Patient Fasting?/# of Hours     Answer:   requires 12 hour fast     No orders of the defined types were placed in this encounter.        Reccommended tobacco cessation options including pharmacologicmethods, counseled great than 3 minutes during this visit:  Yes  []  No  []     Patient given

## 2021-01-04 ENCOUNTER — TELEPHONE (OUTPATIENT)
Dept: SURGERY | Age: 71
End: 2021-01-04

## 2021-01-04 ENCOUNTER — APPOINTMENT (OUTPATIENT)
Dept: CT IMAGING | Age: 71
DRG: 418 | End: 2021-01-04
Payer: MEDICARE

## 2021-01-04 ENCOUNTER — HOSPITAL ENCOUNTER (EMERGENCY)
Age: 71
Discharge: HOME | DRG: 418 | End: 2021-01-04
Attending: EMERGENCY MEDICINE
Payer: MEDICARE

## 2021-01-04 VITALS
HEART RATE: 66 BPM | DIASTOLIC BLOOD PRESSURE: 83 MMHG | SYSTOLIC BLOOD PRESSURE: 164 MMHG | OXYGEN SATURATION: 98 % | WEIGHT: 234 LBS | TEMPERATURE: 97.9 F | BODY MASS INDEX: 31.01 KG/M2 | HEIGHT: 73 IN | RESPIRATION RATE: 16 BRPM

## 2021-01-04 DIAGNOSIS — K80.50 BILIARY COLIC: ICD-10-CM

## 2021-01-04 DIAGNOSIS — R10.13 ABDOMINAL PAIN, EPIGASTRIC: Primary | ICD-10-CM

## 2021-01-04 LAB
ALBUMIN SERPL-MCNC: 4.4 GM/DL (ref 3.4–5)
ALP BLD-CCNC: 73 U/L (ref 46–116)
ALT SERPL-CCNC: 23 U/L (ref 14–63)
ANION GAP: 14 MEQ/L (ref 8–16)
AST SERPL-CCNC: 16 U/L (ref 15–37)
BASOPHILS # BLD: 0.3 % (ref 0–3)
BILIRUB SERPL-MCNC: 0.8 MG/DL (ref 0.2–1)
BUN BLDV-MCNC: 23 MG/DL (ref 7–18)
CHLORIDE BLD-SCNC: 99 MEQ/L (ref 98–107)
CO2: 26 MEQ/L (ref 21–32)
CREAT SERPL-MCNC: 1.6 MG/DL (ref 0.6–1.3)
EKG ATRIAL RATE: 65 BPM
EKG P AXIS: 48 DEGREES
EKG P-R INTERVAL: 188 MS
EKG Q-T INTERVAL: 428 MS
EKG QRS DURATION: 100 MS
EKG QTC CALCULATION (BAZETT): 445 MS
EKG R AXIS: 12 DEGREES
EKG T AXIS: 36 DEGREES
EKG VENTRICULAR RATE: 65 BPM
EOSINOPHILS RELATIVE PERCENT: 1.1 % (ref 0–4)
GFR, ESTIMATED: 46 ML/MIN/1.73M2
GLUCOSE BLD-MCNC: 174 MG/DL (ref 74–106)
HCT VFR BLD CALC: 49.8 % (ref 42–52)
HEMOGLOBIN: 16.3 GM/DL (ref 14–18)
LIPASE: 124 U/L (ref 73–393)
LYMPHOCYTES # BLD: 9 % (ref 15–47)
MCH RBC QN AUTO: 28.7 PG (ref 27–31)
MCHC RBC AUTO-ENTMCNC: 32.7 GM/DL (ref 33–37)
MCV RBC AUTO: 88 FL (ref 80–94)
MONOCYTES: 3.3 % (ref 0–12)
PDW BLD-RTO: 13.6 % (ref 11.5–14.5)
PLATELET # BLD: 182 THOU/MM3 (ref 130–400)
PMV BLD AUTO: 7.3 FL (ref 7.4–10.4)
POC CALCIUM: 10 MG/DL (ref 8.5–10.1)
POTASSIUM SERPL-SCNC: 3.6 MEQ/L (ref 3.5–5.1)
RBC # BLD: 5.66 MILL/MM3 (ref 4.7–6.1)
SEGS: 86.3 % (ref 43–75)
SODIUM BLD-SCNC: 139 MEQ/L (ref 136–145)
TOTAL PROTEIN: 8.2 GM/DL (ref 6.4–8.2)
TROPONIN I: 0.08 NG/ML (ref 0.02–0.06)
WBC # BLD: 7 THOU/MM3 (ref 4.8–10.8)

## 2021-01-04 PROCEDURE — 85025 COMPLETE CBC W/AUTO DIFF WBC: CPT

## 2021-01-04 PROCEDURE — 84484 ASSAY OF TROPONIN QUANT: CPT

## 2021-01-04 PROCEDURE — 6360000004 HC RX CONTRAST MEDICATION: Performed by: EMERGENCY MEDICINE

## 2021-01-04 PROCEDURE — 96375 TX/PRO/DX INJ NEW DRUG ADDON: CPT

## 2021-01-04 PROCEDURE — 74177 CT ABD & PELVIS W/CONTRAST: CPT

## 2021-01-04 PROCEDURE — 80053 COMPREHEN METABOLIC PANEL: CPT

## 2021-01-04 PROCEDURE — 83690 ASSAY OF LIPASE: CPT

## 2021-01-04 PROCEDURE — 99283 EMERGENCY DEPT VISIT LOW MDM: CPT

## 2021-01-04 PROCEDURE — 96374 THER/PROPH/DIAG INJ IV PUSH: CPT

## 2021-01-04 PROCEDURE — 6360000002 HC RX W HCPCS: Performed by: EMERGENCY MEDICINE

## 2021-01-04 PROCEDURE — 36415 COLL VENOUS BLD VENIPUNCTURE: CPT

## 2021-01-04 PROCEDURE — 93005 ELECTROCARDIOGRAM TRACING: CPT | Performed by: EMERGENCY MEDICINE

## 2021-01-04 PROCEDURE — 71275 CT ANGIOGRAPHY CHEST: CPT

## 2021-01-04 PROCEDURE — 2709999900 HC NON-CHARGEABLE SUPPLY

## 2021-01-04 RX ORDER — ONDANSETRON 2 MG/ML
4 INJECTION INTRAMUSCULAR; INTRAVENOUS ONCE
Status: COMPLETED | OUTPATIENT
Start: 2021-01-04 | End: 2021-01-04

## 2021-01-04 RX ORDER — ONDANSETRON 4 MG/1
4 TABLET, ORALLY DISINTEGRATING ORAL EVERY 8 HOURS PRN
Qty: 15 TABLET | Refills: 0 | Status: SHIPPED | OUTPATIENT
Start: 2021-01-04 | End: 2021-01-20 | Stop reason: ALTCHOICE

## 2021-01-04 RX ORDER — HYDROCODONE BITARTRATE AND ACETAMINOPHEN 5; 325 MG/1; MG/1
1 TABLET ORAL EVERY 6 HOURS PRN
Qty: 15 TABLET | Refills: 0 | Status: SHIPPED | OUTPATIENT
Start: 2021-01-04 | End: 2021-01-09

## 2021-01-04 RX ORDER — KETOROLAC TROMETHAMINE 30 MG/ML
15 INJECTION, SOLUTION INTRAMUSCULAR; INTRAVENOUS ONCE
Status: COMPLETED | OUTPATIENT
Start: 2021-01-04 | End: 2021-01-04

## 2021-01-04 RX ADMIN — IOPAMIDOL 100 ML: 755 INJECTION, SOLUTION INTRAVENOUS at 05:38

## 2021-01-04 RX ADMIN — ONDANSETRON 4 MG: 2 INJECTION INTRAMUSCULAR; INTRAVENOUS at 05:10

## 2021-01-04 RX ADMIN — KETOROLAC TROMETHAMINE 15 MG: 30 INJECTION, SOLUTION INTRAMUSCULAR at 05:10

## 2021-01-04 ASSESSMENT — PAIN SCALES - GENERAL: PAINLEVEL_OUTOF10: 5

## 2021-01-04 NOTE — ED NOTES
Pt presents to the front window with complaints of right sided mid back pain and right upper quadrant abdominal pain. Wife was asked to sit in the chair so that I could assess the patient but she keeps stepping in to show me where the pain is. Patient assessed. Triage per computer. 20 g angio begun in [address] [place] [address] and lab work drawn. Wife questions why he needs an IV. Explained to wife that we need to check his blood and have access to give him medications. Pain is 4/10 and the patient is moving all over to try to find a comfortable spot. Dr Maria Ines Olmedoters in to assess patient. Wife again gets up to show us where the patient's pain is.      Jackie Calvo RN  01/04/21 [id number]

## 2021-01-04 NOTE — ED NOTES
Discharge teaching and instructions for condition explained to patient. AVS reviewed. Printed prescriptions given to patient. Patient voiced understanding regarding prescriptions, follow up appointments and care of self at home. Pt discharged to home in stable condition per wife's care.      Ruthie Snow RN  01/04/21 [id number]

## 2021-01-04 NOTE — ED NOTES
In to give patient his medications. Explained the purpose of Toradol before giving it. I tell him that I am going to give him Zofran for the nausea and the wife panics stating \"I don't think he needs that! \" I explain that since Jake Villa is nauseated and is having gurgling in his stomach that the Zofran will help. Wife is hesistant. I ask Jake Villa if it is okay to give him something for his nausea and he agrees.      Alvin Diaz RN  01/04/21 9932

## 2021-01-04 NOTE — ED PROVIDER NOTES
[address] [facility]  [facility] [address]  Phone: [address]    Chief Complaint   Patient presents with    Abdominal Pain     RUQ       HPI    Adelaide Josue is a 79 y.o. male who presents above-noted complaint. Patient presents with abdominal and chest pain. Started after dinner tonight. Pain is mostly in the right periscapular area in the right upper quadrant midepigastric area. No associated symptoms such as weakness but has some nausea. Hard to quantify the pain although feels like a squeezing type pain mostly right-sided. No other cough or respiratory symptoms. No syncope or other problems. PAST MEDICAL HISTORY    Past Medical History:   Diagnosis Date    Hypertension        SURGICAL HISTORY    Past Surgical History:   Procedure Laterality Date    SHOULDER SURGERY         CURRENT MEDICATIONS    Current Outpatient Rx   Medication Sig Dispense Refill    ondansetron (ZOFRAN ODT) 4 MG disintegrating tablet Take 1 tablet by mouth every 8 hours as needed for Nausea or Vomiting 15 tablet 0    HYDROcodone-acetaminophen (NORCO) 5-325 MG per tablet Take 1 tablet by mouth every 6 hours as needed for Pain for up to 5 days.  15 tablet 0    lisinopril-hydroCHLOROthiazide (PRINZIDE;ZESTORETIC) 20-25 MG per tablet take 1 tablet by mouth twice a day 180 tablet 3       ALLERGIES    No Known Allergies    FAMILY HISTORY    Family History   Problem Relation Age of Onset    Heart Disease Mother        SOCIAL HISTORY    Social History     Socioeconomic History    Marital status:      Spouse name: Not on file    Number of children: Not on file    Years of education: Not on file    Highest education level: Not on file   Occupational History    Not on file   Social Needs    Financial resource strain: Not hard at all   [place] insecurity     Worry: Never true     Inability: Never true   [employer] needs Medical: No     Non-medical: No   Tobacco Use    Smoking status: Never Smoker    Smokeless tobacco: Never Used   Substance and Sexual Activity    Alcohol use: Yes     Comment: occassionally    Drug use: No    Sexual activity: Not on file   Lifestyle    Physical activity     Days per week: Not on file     Minutes per session: Not on file    Stress: Not on file   Relationships    Social connections     Talks on phone: Not on file     Gets together: Not on file     Attends Confucianism service: Not on file     Active member of club or organization: Not on file     Attends meetings of clubs or organizations: Not on file     Relationship status: Not on file    Intimate partner violence     Fear of current or ex partner: Not on file     Emotionally abused: Not on file     Physically abused: Not on file     Forced sexual activity: Not on file   Other Topics Concern    Not on file   Social History Narrative    Not on file       REVIEW OF SYSTEMS    Positive for chest and abdominal pain. No urinary symptoms. No bowel bladder habit changes. All systems negative except as marked. PHYSICAL EXAM    VITAL SIGNS: BP (!) 164/83   Pulse 66   Temp 97.9 °F (36.6 °C)   Resp 16   Ht 6' 1\" (1.854 m)   Wt 234 lb (106.1 kg)   SpO2 98%   BMI 30.87 kg/m²    Constitutional:  Alert not toxic or ill,   HENT: COVID mask protection in place normocephalic, Atraumatic, mask lowered to assess  Bilateral external ears normal, Oropharynx moist, No oral exudates, Nose normal.  Cervical Spine: Normal range of motion,  No stridor. No tenderness, Supple,  Eyes:  No discharge or  Swelling,Conjunctiva normal, PERRL, EOMI,  Respiratory: No respiratory distress, Normal breath sounds,  No wheezing, No chest tenderness. Cardiovascular:  Normal heart rate, Normal rhythm, No murmurs, No rubs, No gallops. GI: Mild right upper quadrant reproducible pain, Bowel sounds normal, Soft, No masses, No pulsatile masses.  No peritoneal tenderness  Musculoskeletal:  Intact distal pulses, No edema, No tenderness, No cyanosis, No clubbing. Good range of motion in all major joints. No tenderness to palpation or major deformities noted. Back:No tenderness. Integument:  Warm, Dry, No erythema, No rash (on exposed areas)   Lymphatic:  No lymphadenopathy noted. Neurologic:  Alert & oriented x 3, Normal motor function, Normal sensory function, No focal deficits noted. Psychiatric:  Affect normal, Judgment normal, Mood normal.     EKG    Sinus rate and rhythm without ischemia or infarction. Incomplete right bundle                  RADIOLOGY    CTA CHEST W WO CONTRAST   Final Result   Impression:   1. No CT evidence of thoracic aortic aneurysm or dissection. 2.  No CT evidence of pulmonary embolus. 3.  2 mm noncalcified pulmonary nodule right middle lobe lungs otherwise    clear. Calcified granuloma on the right. This document has been electronically signed by: Fariba De La O MD on    01/04/2021 06:04 AM      All CT scans at this facility use dose modulation, iterative    reconstruction, and/or weight-based   dosing when appropriate to reduce radiation dose to as low as reasonably    achievable. CT ABDOMEN PELVIS W IV CONTRAST Additional Contrast? None   Final Result   Impression:   1. Borderline over distended gallbladder with equivocal gallstones    ultrasound would be confirmatory. 2.  2 mm nonobstructing calyceal stone mid pole right kidney. Suspect    renal cortical cysts bilaterally ultrasound would be confirmatory as well. No evidence of obstructive uropathy. 3.  Normal appendix and terminal ileum. Bowel demonstrates a    nonobstructing pattern. No evidence of acute diverticulitis.       This document has been electronically signed by: Fariba De La O MD on    01/04/2021 06:15 AM      All CT scans at this facility use dose modulation, iterative    reconstruction, and/or weight-based   dosing when appropriate to reduce radiation dose to as low as reasonably    achievable. PROCEDURES    none      CONSULTS:  None      CRITICAL CARE:  None    SCREENINGS  BP (!) 164/83   Pulse 66   Temp 97.9 °F (36.6 °C)   Resp 16   Ht 6' 1\" (1.854 m)   Wt 234 lb (106.1 kg)   SpO2 98%   BMI 30.87 kg/m²        Screening For Hypertension and Follow-up (#317)   previously diagnosed with hypertension and not applicable for screen      Screening For Tobacco Use and Cessation Intervention (#226):   reports that he has never smoked. He has never used smokeless tobacco.  Non-smoker not applicable for screen    ED COURSE & MEDICAL DECISION MAKING    Pertinent Labs & Imaging studies reviewed. (See chart for details)  Patient presents with mostly midepigastric pain right upper quadrant pain right back pain and discomfort. No associate symptoms such as high fever chills although he was nauseated. Does not hurt to breathe or anything else at this time although maybe feels like a squeezing pain with radiation to his right chest and shoulder areas. Checking some routine labs. Initial blood pressure is elevated he does not take his meds until morning. Check a CT of the chest to assess for dissection    REASSESSMENT  5:00 AM  Patient rechecked and updated on lab/xray status, progress and results. .  Patient was reassessed and condition was improved after tx. No further needs at this time. Patient is resting much more comfortably after some Toradol and Zofran. Likely has some biliary colic even some just dyspepsia. He had cheese pizza with onions. 6:27 AM EST  Having issues with chemistries and lipase at this time. We are we did get a point-of-care creatinine. CT abdomen pelvis was obtained and does show some gallstones but borderline size. No signs of infection. Some other nonspecific findings at this time. Symptoms are right-sided midepigastric right upper quadrant postprandial tonight.   Likely consistent with some biliary disease. Troponin indeterminate zone at this time. He has no shortness of breath diaphoresis or exertional pain or other problems. Counseled in regards to biliary disease and going home. Recommend outpatient surgical referral, bland diet, Norco and Zofran. If lab testing does not return returns later this afternoon he will be notified for gross abnormalities. FINAL IMPRESSION    1. Abdominal pain, epigastric    2. Biliary colic         PATIENT REFERRED TO:  Fabian Dietz MD  [address] [facility] [address]  [address] [facility]  [phone]    Call   For evaluation      DISCHARGE MEDICATIONS:  New Prescriptions    HYDROCODONE-ACETAMINOPHEN (NORCO) 5-325 MG PER TABLET    Take 1 tablet by mouth every 6 hours as needed for Pain for up to 5 days.     ONDANSETRON (ZOFRAN ODT) 4 MG DISINTEGRATING TABLET    Take 1 tablet by mouth every 8 hours as needed for Nausea or Vomiting           Mary Pulliam MD  01/07/21 [id number]

## 2021-01-05 ENCOUNTER — TELEPHONE (OUTPATIENT)
Dept: SURGERY | Age: 71
End: 2021-01-05

## 2021-01-05 ENCOUNTER — HOSPITAL ENCOUNTER (OUTPATIENT)
Dept: ULTRASOUND IMAGING | Age: 71
Discharge: HOME | DRG: 418 | End: 2021-01-05
Payer: MEDICARE

## 2021-01-05 ENCOUNTER — HOSPITAL ENCOUNTER (INPATIENT)
Age: 71
LOS: 3 days | Discharge: HOME | DRG: 418 | End: 2021-01-08
Attending: SURGERY | Admitting: SURGERY
Payer: MEDICARE

## 2021-01-05 ENCOUNTER — HOSPITAL ENCOUNTER (OUTPATIENT)
Age: 71
Discharge: HOME | DRG: 418 | End: 2021-01-05
Payer: MEDICARE

## 2021-01-05 VITALS
SYSTOLIC BLOOD PRESSURE: 140 MMHG | HEART RATE: 79 BPM | OXYGEN SATURATION: 94 % | TEMPERATURE: 99.7 F | DIASTOLIC BLOOD PRESSURE: 63 MMHG

## 2021-01-05 DIAGNOSIS — K80.20 GALLSTONES: Primary | ICD-10-CM

## 2021-01-05 DIAGNOSIS — K80.20 GALLSTONES: ICD-10-CM

## 2021-01-05 LAB
ALBUMIN SERPL-MCNC: 4.1 G/DL (ref 3.5–5.1)
ALP BLD-CCNC: 58 U/L (ref 38–126)
ALT SERPL-CCNC: 16 U/L (ref 11–66)
AMYLASE: 67 U/L (ref 20–104)
ANION GAP SERPL CALCULATED.3IONS-SCNC: 10 MEQ/L (ref 8–16)
AST SERPL-CCNC: 27 U/L (ref 5–40)
BILIRUB SERPL-MCNC: 1.8 MG/DL (ref 0.3–1.2)
BILIRUBIN DIRECT: 0.4 MG/DL (ref 0–0.3)
BUN BLDV-MCNC: 31 MG/DL (ref 7–22)
CALCIUM SERPL-MCNC: 10 MG/DL (ref 8.5–10.5)
CHLORIDE BLD-SCNC: 97 MEQ/L (ref 98–111)
CO2: 28 MEQ/L (ref 23–33)
CREAT SERPL-MCNC: 1.4 MG/DL (ref 0.4–1.2)
ERYTHROCYTE [DISTWIDTH] IN BLOOD BY AUTOMATED COUNT: 13 % (ref 11.5–14.5)
ERYTHROCYTE [DISTWIDTH] IN BLOOD BY AUTOMATED COUNT: 40.3 FL (ref 35–45)
GFR SERPL CREATININE-BSD FRML MDRD: 50 ML/MIN/1.73M2
GLUCOSE BLD-MCNC: 147 MG/DL (ref 70–108)
HCT VFR BLD CALC: 47.5 % (ref 42–52)
HEMOGLOBIN: 15.8 GM/DL (ref 14–18)
LIPASE: 27.4 U/L (ref 5.6–51.3)
MCH RBC QN AUTO: 28.7 PG (ref 26–33)
MCHC RBC AUTO-ENTMCNC: 33.3 GM/DL (ref 32.2–35.5)
MCV RBC AUTO: 86.4 FL (ref 80–94)
PLATELET # BLD: 173 THOU/MM3 (ref 130–400)
PMV BLD AUTO: 9.7 FL (ref 9.4–12.4)
POTASSIUM SERPL-SCNC: 4.5 MEQ/L (ref 3.5–5.2)
RBC # BLD: 5.5 MILL/MM3 (ref 4.7–6.1)
SARS-COV-2, NAAT: NOT DETECTED
SODIUM BLD-SCNC: 135 MEQ/L (ref 135–145)
TOTAL PROTEIN: 7.9 G/DL (ref 6.1–8)
WBC # BLD: 12.5 THOU/MM3 (ref 4.8–10.8)

## 2021-01-05 PROCEDURE — 83690 ASSAY OF LIPASE: CPT

## 2021-01-05 PROCEDURE — 36415 COLL VENOUS BLD VENIPUNCTURE: CPT

## 2021-01-05 PROCEDURE — 80053 COMPREHEN METABOLIC PANEL: CPT

## 2021-01-05 PROCEDURE — APPSS60 APP SPLIT SHARED TIME 46-60 MINUTES: Performed by: NURSE PRACTITIONER

## 2021-01-05 PROCEDURE — 6370000000 HC RX 637 (ALT 250 FOR IP): Performed by: NURSE PRACTITIONER

## 2021-01-05 PROCEDURE — 94760 N-INVAS EAR/PLS OXIMETRY 1: CPT

## 2021-01-05 PROCEDURE — C9113 INJ PANTOPRAZOLE SODIUM, VIA: HCPCS | Performed by: NURSE PRACTITIONER

## 2021-01-05 PROCEDURE — 1200000000 HC SEMI PRIVATE

## 2021-01-05 PROCEDURE — 76705 ECHO EXAM OF ABDOMEN: CPT

## 2021-01-05 PROCEDURE — 82248 BILIRUBIN DIRECT: CPT

## 2021-01-05 PROCEDURE — 99222 1ST HOSP IP/OBS MODERATE 55: CPT | Performed by: SURGERY

## 2021-01-05 PROCEDURE — 2580000003 HC RX 258: Performed by: NURSE PRACTITIONER

## 2021-01-05 PROCEDURE — 6360000002 HC RX W HCPCS: Performed by: NURSE PRACTITIONER

## 2021-01-05 PROCEDURE — 85027 COMPLETE CBC AUTOMATED: CPT

## 2021-01-05 PROCEDURE — U0002 COVID-19 LAB TEST NON-CDC: HCPCS

## 2021-01-05 PROCEDURE — 82150 ASSAY OF AMYLASE: CPT

## 2021-01-05 RX ORDER — SODIUM CHLORIDE 0.9 % (FLUSH) 0.9 %
10 SYRINGE (ML) INJECTION EVERY 12 HOURS SCHEDULED
Status: DISCONTINUED | OUTPATIENT
Start: 2021-01-05 | End: 2021-01-07 | Stop reason: SDUPTHER

## 2021-01-05 RX ORDER — MORPHINE SULFATE 4 MG/ML
4 INJECTION, SOLUTION INTRAMUSCULAR; INTRAVENOUS
Status: DISCONTINUED | OUTPATIENT
Start: 2021-01-05 | End: 2021-01-07 | Stop reason: SDUPTHER

## 2021-01-05 RX ORDER — MORPHINE SULFATE 2 MG/ML
2 INJECTION, SOLUTION INTRAMUSCULAR; INTRAVENOUS
Status: DISCONTINUED | OUTPATIENT
Start: 2021-01-05 | End: 2021-01-07 | Stop reason: SDUPTHER

## 2021-01-05 RX ORDER — HYDROCHLOROTHIAZIDE 25 MG/1
25 TABLET ORAL DAILY
Status: DISCONTINUED | OUTPATIENT
Start: 2021-01-05 | End: 2021-01-08 | Stop reason: HOSPADM

## 2021-01-05 RX ORDER — HYDROCODONE BITARTRATE AND ACETAMINOPHEN 5; 325 MG/1; MG/1
1 TABLET ORAL EVERY 4 HOURS PRN
Status: DISCONTINUED | OUTPATIENT
Start: 2021-01-05 | End: 2021-01-07 | Stop reason: SDUPTHER

## 2021-01-05 RX ORDER — KETOROLAC TROMETHAMINE 30 MG/ML
15 INJECTION, SOLUTION INTRAMUSCULAR; INTRAVENOUS EVERY 6 HOURS PRN
Status: DISCONTINUED | OUTPATIENT
Start: 2021-01-05 | End: 2021-01-05

## 2021-01-05 RX ORDER — ONDANSETRON 2 MG/ML
4 INJECTION INTRAMUSCULAR; INTRAVENOUS EVERY 6 HOURS PRN
Status: DISCONTINUED | OUTPATIENT
Start: 2021-01-05 | End: 2021-01-07 | Stop reason: SDUPTHER

## 2021-01-05 RX ORDER — PANTOPRAZOLE SODIUM 40 MG/10ML
40 INJECTION, POWDER, LYOPHILIZED, FOR SOLUTION INTRAVENOUS DAILY
Status: DISCONTINUED | OUTPATIENT
Start: 2021-01-05 | End: 2021-01-08 | Stop reason: HOSPADM

## 2021-01-05 RX ORDER — LISINOPRIL AND HYDROCHLOROTHIAZIDE 25; 20 MG/1; MG/1
1 TABLET ORAL DAILY
Status: DISCONTINUED | OUTPATIENT
Start: 2021-01-05 | End: 2021-01-05 | Stop reason: CLARIF

## 2021-01-05 RX ORDER — LISINOPRIL 20 MG/1
20 TABLET ORAL DAILY
Status: DISCONTINUED | OUTPATIENT
Start: 2021-01-05 | End: 2021-01-08 | Stop reason: HOSPADM

## 2021-01-05 RX ORDER — SODIUM CHLORIDE 9 MG/ML
10 INJECTION INTRAVENOUS DAILY
Status: DISCONTINUED | OUTPATIENT
Start: 2021-01-05 | End: 2021-01-08 | Stop reason: HOSPADM

## 2021-01-05 RX ORDER — ONDANSETRON 4 MG/1
4 TABLET, ORALLY DISINTEGRATING ORAL EVERY 8 HOURS PRN
Status: DISCONTINUED | OUTPATIENT
Start: 2021-01-05 | End: 2021-01-08 | Stop reason: HOSPADM

## 2021-01-05 RX ORDER — SODIUM CHLORIDE 0.9 % (FLUSH) 0.9 %
10 SYRINGE (ML) INJECTION PRN
Status: DISCONTINUED | OUTPATIENT
Start: 2021-01-05 | End: 2021-01-07 | Stop reason: SDUPTHER

## 2021-01-05 RX ORDER — HYDROCODONE BITARTRATE AND ACETAMINOPHEN 5; 325 MG/1; MG/1
2 TABLET ORAL EVERY 4 HOURS PRN
Status: DISCONTINUED | OUTPATIENT
Start: 2021-01-05 | End: 2021-01-07 | Stop reason: SDUPTHER

## 2021-01-05 RX ORDER — SODIUM CHLORIDE 9 MG/ML
INJECTION, SOLUTION INTRAVENOUS CONTINUOUS
Status: DISCONTINUED | OUTPATIENT
Start: 2021-01-05 | End: 2021-01-07 | Stop reason: SDUPTHER

## 2021-01-05 RX ADMIN — HYDROCODONE BITARTRATE AND ACETAMINOPHEN 1 TABLET: 5; 325 TABLET ORAL at 20:52

## 2021-01-05 RX ADMIN — SODIUM CHLORIDE, PRESERVATIVE FREE 10 ML: 5 INJECTION INTRAVENOUS at 16:22

## 2021-01-05 RX ADMIN — KETOROLAC TROMETHAMINE 15 MG: 30 INJECTION, SOLUTION INTRAMUSCULAR at 16:19

## 2021-01-05 RX ADMIN — PIPERACILLIN AND TAZOBACTAM 3.38 G: 3; .375 INJECTION, POWDER, LYOPHILIZED, FOR SOLUTION INTRAVENOUS at 16:39

## 2021-01-05 RX ADMIN — HYDROCODONE BITARTRATE AND ACETAMINOPHEN 2 TABLET: 5; 325 TABLET ORAL at 14:30

## 2021-01-05 RX ADMIN — Medication 10 ML: at 15:26

## 2021-01-05 RX ADMIN — MORPHINE SULFATE 2 MG: 2 INJECTION, SOLUTION INTRAMUSCULAR; INTRAVENOUS at 23:17

## 2021-01-05 RX ADMIN — PANTOPRAZOLE SODIUM 40 MG: 40 INJECTION, POWDER, FOR SOLUTION INTRAVENOUS at 16:33

## 2021-01-05 RX ADMIN — SODIUM CHLORIDE: 9 INJECTION, SOLUTION INTRAVENOUS at 16:25

## 2021-01-05 RX ADMIN — MORPHINE SULFATE 2 MG: 2 INJECTION, SOLUTION INTRAMUSCULAR; INTRAVENOUS at 15:23

## 2021-01-05 ASSESSMENT — PAIN SCALES - GENERAL
PAINLEVEL_OUTOF10: 8
PAINLEVEL_OUTOF10: 4
PAINLEVEL_OUTOF10: 5

## 2021-01-05 ASSESSMENT — PAIN DESCRIPTION - PAIN TYPE
TYPE: ACUTE PAIN
TYPE: ACUTE PAIN

## 2021-01-05 ASSESSMENT — PAIN DESCRIPTION - ORIENTATION
ORIENTATION: RIGHT
ORIENTATION: RIGHT;UPPER

## 2021-01-05 ASSESSMENT — PAIN DESCRIPTION - LOCATION: LOCATION: ABDOMEN

## 2021-01-05 NOTE — TELEPHONE ENCOUNTER
Per Dr Nicolas Barroso patient to get Ultrasound of gallbladder ASAP and repeat labs. Orders placed and spoke with wife Radha Vega they are aware.  Pt set up to get Ultrasound at [address] today [address]

## 2021-01-05 NOTE — TELEPHONE ENCOUNTER
Ultrasound reviewed with Dr Gilmer Del Cid in a lot of pain would like to be admitted. Bed obtained and patient and wife aware.

## 2021-01-05 NOTE — H&P
Sunday which he had pizza and ice cream. Associated nausea & vomiting. Has not vomited since the ER yesterday. Feels bloated. Appetite poor. Has not eaten today at all. No change in bowel habits. No melena or hematochezia. No fevers or chills. Feels fatigued. No recent EGD or colonoscopy. Has never been told he had gallstones but he has had similar attacks like this in the past. The last was a couple years ago. No urinary complaints. No SOB or chest pain. No lightheadedness or dizziness. Past Medical History      Diagnosis Date    Hypertension      Past Surgical History      Procedure Laterality Date    SHOULDER SURGERY       Medications  Prior to Admission medications    Medication Sig Start Date End Date Taking? Authorizing Provider   ondansetron (ZOFRAN ODT) 4 MG disintegrating tablet Take 1 tablet by mouth every 8 hours as needed for Nausea or Vomiting 1/4/21   Gaby Aquino MD   HYDROcodone-acetaminophen (NORCO) 5-325 MG per tablet Take 1 tablet by mouth every 6 hours as needed for Pain for up to 5 days. 1/4/21 1/9/21  Gaby Aquino MD   lisinopril-hydroCHLOROthiazide (PRINZIDE;ZESTORETIC) 20-25 MG per tablet take 1 tablet by mouth twice a day 3/4/20   Dax Montes MD    Scheduled Meds:   sodium chloride flush  10 mL Intravenous 2 times per day    piperacillin-tazobactam  3.375 g Intravenous Q8H    pantoprazole  40 mg Intravenous Daily    And    sodium chloride (PF)  10 mL Intravenous Daily     Continuous Infusions:   sodium chloride       PRN Meds:.sodium chloride flush, ondansetron **OR** ondansetron, morphine **OR** morphine, HYDROcodone 5 mg - acetaminophen **OR** HYDROcodone 5 mg - acetaminophen, ketorolac  Allergies  has No Known Allergies. Family History  family history includes Heart Disease in his mother. Social History   reports that he has never smoked. He has never used smokeless tobacco. He reports current alcohol use. He reports that he does not use drugs.   Review of Systems:  General Denies any fever or chills. No significant unexpected weight change. Change in appetite. HEENT Denies any diplopia, tinnitus or vertigo. No chronic headaches. Resp Denies any shortness of breath, cough or wheezing  Cardiac Denies any chest pain, palpitations, claudication or edema  GI Denies any melena, hematochezia, hematemesis or pyrosis. Positive for RUQ abdominal pain, nausea & vomiting. Abdominal bloating.  Denies any frequency, urgency, hesitancy or incontinence  Heme Denies bruising or bleeding easily  Endocrine Denies any history of diabetes or thyroid disease  Neuro Denies any focal motor or sensory deficits  Musculoskeletal  Denies osteoarthritis. No gout. No weakness. Psychiatric  Denies any severe depression or agitation. No panic attacks. No suicidal ideation. OBJECTIVE:   CURRENT VITALS:  respiration is 22. Temperature Range (24h):  Temp  Av.7 °F (37.6 °C)  Min: 99.7 °F (37.6 °C)  Max: 99.7 °F (37.6 °C)  BP Range (56Q): Systolic (71KTD), QLS:418 , Min:140 , EIO:317     Diastolic (65FLY), MAF:81, Min:63, Max:63    Pulse Range (24h): Pulse  Av  Min: 79  Max: 79  Respiration Range (24h): Resp  Av  Min: 22  Max: 22  Current Pulse Ox (24h):     Pulse Ox Range (24h):  SpO2  Av %  Min: 94 %  Max: 94 %  Oxygen Amount and Delivery:    CONSTITUTIONAL: Alert and oriented times 3, no acute distress and cooperative to examination with proper mood and affect. SKIN: Skin color, texture, turgor normal. No rashes or lesions. LYMPH: no cervical nodes, no inguinal nodes  HEENT: Head is normocephalic, atraumatic. NECK: Supple, symmetrical, trachea midline  CHEST/LUNGS:  normal respiratory rate and rhythm, lungs clear to auscultation without wheezes, rales or rhonchi. CARDIOVASCULAR: Heart sounds are normal.  Regular rate and rhythm without murmur, gallop or rub. Normal S1 and S2.  ABDOMEN: Rounded. Softly distended. No and Laparoscopic scar(s) present.  Normal bowel sounds. Tenderness: RUQ without guarding or peritoneal signs. RECTAL: deferred, not clinically indicated  NEUROLOGIC: There are no focalizing motor or sensory deficits. CN II-XII are grossly intact. Monster Sosa EXTREMITIES: no cyanosis, no clubbing. Trace edema to lower extremities. LABS:     Recent Labs     01/04/21  0455 01/05/21  1248   WBC 7.0 12.5*   HGB 16.3 15.8   HCT 49.8 47.5    173    135   K 3.6 4.5   CL 99 97*   CO2 26 28   BUN 23* 31*   CREATININE 1.6* 1.4*   CALCIUM  --  10.0   AST 16 27   ALT 23 16   BILITOT 0.8 1.8*   BILIDIR  --  0.4*   AMYLASE  --  67   LIPASE 124.0 27.4     RADIOLOGY:   I have personally reviewed the following films:    PROCEDURE: US GALLBLADDER RUQ       CLINICAL INFORMATION: Gallstones.       COMPARISON: No prior study.       TECHNIQUE:Real-time transabdominal ultrasound was performed.       FINDINGS:       Liver - L= 17.1 cm heterogeneous echogenicity.       Gallbladder - 10.0 x 4.9 x 5.0 cm distended gallbladder. Gallstones. Limited evaluation of the gallbladder neck. Gallbladder Wall - 0.56 cm        Common Duct - 0.60 cm       Rodriguez's Sign: neg            Impression       Distended gallbladder with gallstones. Gallbladder neck is limited in evaluation.  Correlation with serology is advised.               **This report has been created using voice recognition software. It may contain minor errors which are inherent in voice recognition technology. **       Final report electronically signed by Dr. Stephane Snow on 1/5/2021 12:32 PM     CT abdomen and pelvis with IV contrast       Comparison: None       Findings:   Lung bases show no active disease.  No dependent layering pleural    effusions.  The heart is not enlarged.       Liver normal size and contour.  No focal hepatic lesions.  Patent hepatic    and portal veins.  Slightly over distended gallbladder.  Equivocal    gallstones ultrasound would be confirmatory.  Homogeneous enhancement of    the pancreas.  Calcified splenic granulomas       Normal adrenal glands.  Bilateral fluid attenuating renal cortical lesions    measuring 2.4 cm mid pole left and 2.0 cm mid pole right probable cysts    ultrasound correlation would be confirmatory.  2 mm nonobstructing    calyceal stone mid pole right kidney. Barksdale Piggs caliber abdominal aorta.       Bowel demonstrates a nonobstructive pattern.  No free air or fluid or    inflammatory process demonstrated.  Normal appendix and terminal ileum.     Diverticulosis coli without CT evidence of acute diverticulitis.  2 cm    fat-containing umbilical hernia without evidence of omental infarction.     No intraperitoneal or retroperitoneal pelvic or inguinal masses    lymphadenopathy or abnormal fluid collections. Luevenia Coventry gland is mildly    enlarged.  Normal distention of the urinary bladder. .       No vertebral body compression fractures or spondylolisthesis.  No bony    destructive lesions.           Impression   Impression:   1.  Borderline over distended gallbladder with equivocal gallstones    ultrasound would be confirmatory. 2.  2 mm nonobstructing calyceal stone mid pole right kidney.  Suspect    renal cortical cysts bilaterally ultrasound would be confirmatory as well.     No evidence of obstructive uropathy. 3.  Normal appendix and terminal ileum.  Bowel demonstrates a    nonobstructing pattern.  No evidence of acute diverticulitis.       This document has been electronically signed by: Rafa Lobato MD on    01/04/2021 06:15 AM       All CT scans at this facility use dose modulation, iterative    reconstruction, and/or weight-based   dosing when appropriate to reduce radiation dose to as low as reasonably    achievable. CT angiography chest using contrast.       Comparison: None       Findings:    There is good opacification of the thoracic aorta.       Thoracic aortic diameters is as follows:       Sinus of Valsalva: 3.4 cm   Sinotubular junction: 3.2 cm   Mid ascending aorta: 3.6 cm   Mid aortic arch: 3.2 cm   Mid descending aorta: 3.1 cm   Aortic hiatus level: 2.8 cm       No anomalous origins of the great vessels. Heart size within normal limits. RV/LV ratio normal.No pericardial    effusions. No mediastinal hematoma. Lung fields are normally expanded. No CT evidence of pulmonary embolus. 2 mm noncalcified pulmonary nodule right middle lobe. No pneumothorax, pleural effusions, pleural plaques or calcifications.       No bony destructive processes demonstrated.           Impression   Impression:   1.  No CT evidence of thoracic aortic aneurysm or dissection. 2.  No CT evidence of pulmonary embolus. 3.  2 mm noncalcified pulmonary nodule right middle lobe lungs otherwise    clear.  Calcified granuloma on the right.       This document has been electronically signed by: Anibal Ramos MD on    01/04/2021 06:04 AM       All CT scans at this facility use dose modulation, iterative    reconstruction, and/or weight-based   dosing when appropriate to reduce radiation dose to as low as reasonably    achievable.         Electronically signed by ROSIE Oglesby CNP on 1/5/2021 at 4:12 PM       Patient seen and examined independently by me. Above discussed and I agree with Bettie Harrington CNP. See my additional comments below for updated orders and plan. Labs, cultures, and radiographs where available were reviewed. I discussed patient concerns with the patient's nurse and instructions were given. Please see our orders for the updated patient care plan. Bowel rest. IV fluid hydration. AM labs. MRCP. IV antibiotics. Pain and nausea control. Pros and cons of cholecystectomy discussed. All questions answered. Await on am labs and MRCP.      Electronically signed by Yoli Pichardo MD on 1/6/2021 at 12:50 PM

## 2021-01-06 ENCOUNTER — APPOINTMENT (OUTPATIENT)
Dept: MRI IMAGING | Age: 71
DRG: 418 | End: 2021-01-06
Attending: SURGERY
Payer: MEDICARE

## 2021-01-06 LAB
ALBUMIN SERPL-MCNC: 3.3 G/DL (ref 3.5–5.1)
ALP BLD-CCNC: 49 U/L (ref 38–126)
ALT SERPL-CCNC: 13 U/L (ref 11–66)
ANION GAP SERPL CALCULATED.3IONS-SCNC: 9 MEQ/L (ref 8–16)
AST SERPL-CCNC: 23 U/L (ref 5–40)
BILIRUB SERPL-MCNC: 1.7 MG/DL (ref 0.3–1.2)
BILIRUBIN DIRECT: 0.5 MG/DL (ref 0–0.3)
BUN BLDV-MCNC: 35 MG/DL (ref 7–22)
CALCIUM SERPL-MCNC: 9.2 MG/DL (ref 8.5–10.5)
CHLORIDE BLD-SCNC: 101 MEQ/L (ref 98–111)
CO2: 22 MEQ/L (ref 23–33)
CREAT SERPL-MCNC: 1.4 MG/DL (ref 0.4–1.2)
ERYTHROCYTE [DISTWIDTH] IN BLOOD BY AUTOMATED COUNT: 13 % (ref 11.5–14.5)
ERYTHROCYTE [DISTWIDTH] IN BLOOD BY AUTOMATED COUNT: 42 FL (ref 35–45)
GFR SERPL CREATININE-BSD FRML MDRD: 50 ML/MIN/1.73M2
GLUCOSE BLD-MCNC: 133 MG/DL (ref 70–108)
HCT VFR BLD CALC: 44.2 % (ref 42–52)
HEMOGLOBIN: 14.6 GM/DL (ref 14–18)
LIPASE: 27 U/L (ref 5.6–51.3)
MCH RBC QN AUTO: 29.3 PG (ref 26–33)
MCHC RBC AUTO-ENTMCNC: 33 GM/DL (ref 32.2–35.5)
MCV RBC AUTO: 88.8 FL (ref 80–94)
PLATELET # BLD: 141 THOU/MM3 (ref 130–400)
PMV BLD AUTO: 10.5 FL (ref 9.4–12.4)
POTASSIUM SERPL-SCNC: 4.1 MEQ/L (ref 3.5–5.2)
RBC # BLD: 4.98 MILL/MM3 (ref 4.7–6.1)
SODIUM BLD-SCNC: 132 MEQ/L (ref 135–145)
TOTAL PROTEIN: 6.5 G/DL (ref 6.1–8)
WBC # BLD: 11 THOU/MM3 (ref 4.8–10.8)

## 2021-01-06 PROCEDURE — 85027 COMPLETE CBC AUTOMATED: CPT

## 2021-01-06 PROCEDURE — 83690 ASSAY OF LIPASE: CPT

## 2021-01-06 PROCEDURE — 6370000000 HC RX 637 (ALT 250 FOR IP): Performed by: NURSE PRACTITIONER

## 2021-01-06 PROCEDURE — C9113 INJ PANTOPRAZOLE SODIUM, VIA: HCPCS | Performed by: NURSE PRACTITIONER

## 2021-01-06 PROCEDURE — 6360000004 HC RX CONTRAST MEDICATION: Performed by: NURSE PRACTITIONER

## 2021-01-06 PROCEDURE — 6360000002 HC RX W HCPCS: Performed by: NURSE PRACTITIONER

## 2021-01-06 PROCEDURE — 99231 SBSQ HOSP IP/OBS SF/LOW 25: CPT | Performed by: NURSE PRACTITIONER

## 2021-01-06 PROCEDURE — 1200000000 HC SEMI PRIVATE

## 2021-01-06 PROCEDURE — A9579 GAD-BASE MR CONTRAST NOS,1ML: HCPCS | Performed by: NURSE PRACTITIONER

## 2021-01-06 PROCEDURE — 94760 N-INVAS EAR/PLS OXIMETRY 1: CPT

## 2021-01-06 PROCEDURE — 80053 COMPREHEN METABOLIC PANEL: CPT

## 2021-01-06 PROCEDURE — 2580000003 HC RX 258: Performed by: NURSE PRACTITIONER

## 2021-01-06 PROCEDURE — 82248 BILIRUBIN DIRECT: CPT

## 2021-01-06 PROCEDURE — 74183 MRI ABD W/O CNTR FLWD CNTR: CPT

## 2021-01-06 PROCEDURE — 36415 COLL VENOUS BLD VENIPUNCTURE: CPT

## 2021-01-06 RX ORDER — DOCUSATE SODIUM 100 MG/1
100 CAPSULE, LIQUID FILLED ORAL 2 TIMES DAILY PRN
Status: DISCONTINUED | OUTPATIENT
Start: 2021-01-06 | End: 2021-01-08 | Stop reason: HOSPADM

## 2021-01-06 RX ORDER — LORAZEPAM 2 MG/ML
0.5 INJECTION INTRAMUSCULAR NIGHTLY PRN
Status: DISCONTINUED | OUTPATIENT
Start: 2021-01-06 | End: 2021-01-08 | Stop reason: HOSPADM

## 2021-01-06 RX ORDER — ACETAMINOPHEN 325 MG/1
650 TABLET ORAL EVERY 4 HOURS PRN
Status: DISCONTINUED | OUTPATIENT
Start: 2021-01-06 | End: 2021-01-08 | Stop reason: HOSPADM

## 2021-01-06 RX ORDER — LORAZEPAM 2 MG/ML
1 INJECTION INTRAMUSCULAR ONCE
Status: COMPLETED | OUTPATIENT
Start: 2021-01-06 | End: 2021-01-06

## 2021-01-06 RX ADMIN — MORPHINE SULFATE 2 MG: 2 INJECTION, SOLUTION INTRAMUSCULAR; INTRAVENOUS at 07:54

## 2021-01-06 RX ADMIN — LORAZEPAM 1 MG: 2 INJECTION INTRAMUSCULAR; INTRAVENOUS at 09:21

## 2021-01-06 RX ADMIN — MORPHINE SULFATE 2 MG: 2 INJECTION, SOLUTION INTRAMUSCULAR; INTRAVENOUS at 19:40

## 2021-01-06 RX ADMIN — MORPHINE SULFATE 2 MG: 2 INJECTION, SOLUTION INTRAMUSCULAR; INTRAVENOUS at 12:02

## 2021-01-06 RX ADMIN — LISINOPRIL 20 MG: 20 TABLET ORAL at 08:36

## 2021-01-06 RX ADMIN — PANTOPRAZOLE SODIUM 40 MG: 40 INJECTION, POWDER, FOR SOLUTION INTRAVENOUS at 08:36

## 2021-01-06 RX ADMIN — HYDROCODONE BITARTRATE AND ACETAMINOPHEN 1 TABLET: 5; 325 TABLET ORAL at 02:15

## 2021-01-06 RX ADMIN — MORPHINE SULFATE 2 MG: 2 INJECTION, SOLUTION INTRAMUSCULAR; INTRAVENOUS at 04:28

## 2021-01-06 RX ADMIN — PIPERACILLIN AND TAZOBACTAM 3.38 G: 3; .375 INJECTION, POWDER, LYOPHILIZED, FOR SOLUTION INTRAVENOUS at 09:24

## 2021-01-06 RX ADMIN — MORPHINE SULFATE 2 MG: 2 INJECTION, SOLUTION INTRAMUSCULAR; INTRAVENOUS at 16:37

## 2021-01-06 RX ADMIN — HYDROCHLOROTHIAZIDE 25 MG: 25 TABLET ORAL at 08:36

## 2021-01-06 RX ADMIN — MORPHINE SULFATE 2 MG: 2 INJECTION, SOLUTION INTRAMUSCULAR; INTRAVENOUS at 14:32

## 2021-01-06 RX ADMIN — PIPERACILLIN AND TAZOBACTAM 3.38 G: 3; .375 INJECTION, POWDER, LYOPHILIZED, FOR SOLUTION INTRAVENOUS at 16:30

## 2021-01-06 RX ADMIN — GADOTERIDOL 20 ML: 279.3 INJECTION, SOLUTION INTRAVENOUS at 14:15

## 2021-01-06 RX ADMIN — Medication 10 ML: at 08:36

## 2021-01-06 RX ADMIN — PIPERACILLIN AND TAZOBACTAM 3.38 G: 3; .375 INJECTION, POWDER, LYOPHILIZED, FOR SOLUTION INTRAVENOUS at 02:11

## 2021-01-06 RX ADMIN — SODIUM CHLORIDE: 9 INJECTION, SOLUTION INTRAVENOUS at 16:40

## 2021-01-06 RX ADMIN — ACETAMINOPHEN 650 MG: 325 TABLET ORAL at 17:16

## 2021-01-06 ASSESSMENT — PAIN DESCRIPTION - PAIN TYPE: TYPE: ACUTE PAIN

## 2021-01-06 ASSESSMENT — PAIN SCALES - GENERAL
PAINLEVEL_OUTOF10: 4
PAINLEVEL_OUTOF10: 5
PAINLEVEL_OUTOF10: 3
PAINLEVEL_OUTOF10: 7

## 2021-01-06 ASSESSMENT — PAIN DESCRIPTION - LOCATION: LOCATION: ABDOMEN

## 2021-01-06 NOTE — PROGRESS NOTES
[facility] - Dr. Magdalena Pacheco  Daily Progress Note    Pt Name: Denis Green  Medical Record Number: [id number]  Date of Birth 1950   Today's Date: 1/6/2021    Hospital day # 1     ASSESSMENT   1. RUQ abdominal pain  2. Symptomatic cholelithiasis  3. Elevated LFTs  4. Nausea & vomiting   5. Hypertension  6. Leukocytosis   7. Acute kidney injury   has a past medical history of Hypertension. PLAN   1. MRCP this morning - ativan ordered prior to test secondary to clausterphobia  2. NPO until imaging completed then okay for liquids pending results  3. IV fluids  4. Pain & nausea control  5. Protonix IV  6. Up as kati  7. Antibiotics   8. SCDs for DVT prophylaxis  9. Labs reviewed. WBC a little better. Bili and creatinine same. Repeat in am. Lipase normal.  10. Clinically, looks okay. Awaiting MRCP this afternoon to see what next step is. Questions answered this morning. Did have a couple low grade temps overnight. If MRCP looks okay then hopefully surgery tomorrow. If choledocholithiasis then GI will be consulted. SUBJECTIVE   Chief complaint: RUQ abdominal pain    Patient was stable overnight. Chart reviewed. Updated by nursing staff. Had a couple low grade temps, highest 100.4. Concerned with getting MRI this morning so dose of ativan ordered. He is NPO now for MRCP. States he slept okay but has increasing RUQ pain. Morphine helps somewhat with 2 mg dose but may need the 4 mg dose later. No nausea. Denies chest discomfort or dyspnea. No vomiting. (+) belching, flatus. Feels bloated still. Tolerating DIET CLEAR LIQUID; diet. Up ad kati. Urinating without complaints.    CURRENT MEDICATIONS   Scheduled Meds:   sodium chloride flush  10 mL Intravenous 2 times per day    piperacillin-tazobactam  3.375 g Intravenous Q8H    pantoprazole  40 mg Intravenous Daily    And    sodium chloride (PF)  10 mL Intravenous Daily    lisinopril  20 mg Oral Daily    And    hydroCHLOROthiazide  25 mg Oral Daily     Continuous Infusions:   sodium chloride 75 mL/hr at 21 1625     PRN Meds:.sodium chloride flush, ondansetron **OR** ondansetron, morphine **OR** morphine, HYDROcodone 5 mg - acetaminophen **OR** HYDROcodone 5 mg - acetaminophen  OBJECTIVE   CURRENT VITALS:  height is 6' 1\" (1.854 m) and weight is 234 lb (106.1 kg). His oral temperature is 99.4 °F (37.4 °C). His blood pressure is 163/81 (abnormal) and his pulse is 82. His respiration is 16 and oxygen saturation is 93%. Temperature Range (24h):Temp: 99.4 °F (37.4 °C) Temp  Av.3 °F (37.4 °C)  Min: 98.8 °F (37.1 °C)  Max: 99.7 °F (37.6 °C)  BP Range (80I): Systolic (63JNN), TJF:151 , Min:131 , PZD:482     Diastolic (70RYK), CTT:87, Min:63, Max:81    Pulse Range (24h): Pulse  Av  Min: 73  Max: 82  Respiration Range (24h): Resp  Av  Min: 16  Max: 22  Current Pulse Ox (24h):  SpO2: 93 %  Pulse Ox Range (24h):  SpO2  Av.7 %  Min: 93 %  Max: 94 %  Oxygen Amount and Delivery:    Incentive Spirometry Tx:            GENERAL: alert, cooperative, no distress  SKIN: Skin color, texture, turgor normal. No rashes or lesions. HEENT: Head is normocephalic, atraumatic. NECK: Supple, symmetrical, trachea midline  LUNGS: clear to ausculation, without wheezes, rales or rhonci  HEART: normal rate and regular rhythm  ABDOMEN: soft, RUQ tender with mild guarding, distended, bowel sounds present   NEUROLOGIC: There are no focalizing motor or sensory deficits. EXTREMITIES: no cyanosis, no clubbing and no edema. In: 4676 [I.V.:4676]  Out: -     LABS     Recent Labs     21  0455 21  1248 21  0407   WBC 7.0 12.5* 11.0*   HGB 16.3 15.8 14.6   HCT 49.8 47.5 44.2    173 141    135 132*   K 3.6 4.5 4.1   CL 99 97* 101   CO2 26 28 22*   BUN 23* 31* 35*   CREATININE 1.6* 1.4* 1.4*   CALCIUM  --  10.0 9.2      No results for input(s): PTT, INR in the last 72 hours.     Invalid input(s): PT  Recent Labs     21  0456 01/05/21  1248 01/06/21  0407   AST 16 27 23   ALT 23 16 13   BILITOT 0.8 1.8* 1.7*   BILIDIR  --  0.4* 0.5*   AMYLASE  --  67  --    LIPASE 124.0 27.4 27.0     RADIOLOGY   MRCP pending    Electronically signed by ROSIE Thompson CNP on 1/6/2021 at 8:08 AM

## 2021-01-06 NOTE — CARE COORDINATION
DISASTER CHARTING    1/6/21, 12:33 PM EST    DISCHARGE ONGOING EVALUATION:     [address] day: 1  Location: [facility]17/017-A Reason for admit: Gallstones [K80.20]   Barriers to Discharge: Direct admit per Dr. Juwan Burk. IV fluids, Zosyn, clear liquids, MRI of abdomen, SCD's, up as tolerated. PCP: Althea Elizalde MD  Patient Goals/Plan/Treatment Preferences: Met with Sukhi Hays and his wife present at bedside. Sukhi Shown verifies his insurance and PCP. His wife will transport him home at discharge. He can afford his medications and is independent managing his health care needs. Sukhi Shown denies a need for services or DME at discharge.

## 2021-01-07 ENCOUNTER — ANESTHESIA EVENT (OUTPATIENT)
Dept: OPERATING ROOM | Age: 71
DRG: 418 | End: 2021-01-07
Payer: MEDICARE

## 2021-01-07 ENCOUNTER — ANESTHESIA (OUTPATIENT)
Dept: OPERATING ROOM | Age: 71
DRG: 418 | End: 2021-01-07
Payer: MEDICARE

## 2021-01-07 VITALS — OXYGEN SATURATION: 100 % | SYSTOLIC BLOOD PRESSURE: 146 MMHG | DIASTOLIC BLOOD PRESSURE: 87 MMHG | TEMPERATURE: 96.8 F

## 2021-01-07 LAB
ALBUMIN SERPL-MCNC: 3.3 G/DL (ref 3.5–5.1)
ALP BLD-CCNC: 46 U/L (ref 38–126)
ALT SERPL-CCNC: 14 U/L (ref 11–66)
ANION GAP SERPL CALCULATED.3IONS-SCNC: 12 MEQ/L (ref 8–16)
AST SERPL-CCNC: 16 U/L (ref 5–40)
BILIRUB SERPL-MCNC: 1.8 MG/DL (ref 0.3–1.2)
BILIRUBIN DIRECT: 0.7 MG/DL (ref 0–0.3)
BUN BLDV-MCNC: 34 MG/DL (ref 7–22)
CALCIUM SERPL-MCNC: 8.9 MG/DL (ref 8.5–10.5)
CHLORIDE BLD-SCNC: 101 MEQ/L (ref 98–111)
CO2: 25 MEQ/L (ref 23–33)
CREAT SERPL-MCNC: 1.4 MG/DL (ref 0.4–1.2)
ERYTHROCYTE [DISTWIDTH] IN BLOOD BY AUTOMATED COUNT: 12.9 % (ref 11.5–14.5)
ERYTHROCYTE [DISTWIDTH] IN BLOOD BY AUTOMATED COUNT: 40.9 FL (ref 35–45)
GFR SERPL CREATININE-BSD FRML MDRD: 50 ML/MIN/1.73M2
GLUCOSE BLD-MCNC: 146 MG/DL (ref 70–108)
HCT VFR BLD CALC: 41.2 % (ref 42–52)
HEMOGLOBIN: 13.4 GM/DL (ref 14–18)
LIPASE: 24.4 U/L (ref 5.6–51.3)
MCH RBC QN AUTO: 28.3 PG (ref 26–33)
MCHC RBC AUTO-ENTMCNC: 32.5 GM/DL (ref 32.2–35.5)
MCV RBC AUTO: 86.9 FL (ref 80–94)
PLATELET # BLD: 123 THOU/MM3 (ref 130–400)
PMV BLD AUTO: 10 FL (ref 9.4–12.4)
POTASSIUM SERPL-SCNC: 4.2 MEQ/L (ref 3.5–5.2)
RBC # BLD: 4.74 MILL/MM3 (ref 4.7–6.1)
SODIUM BLD-SCNC: 138 MEQ/L (ref 135–145)
TOTAL PROTEIN: 6.2 G/DL (ref 6.1–8)
WBC # BLD: 10.2 THOU/MM3 (ref 4.8–10.8)

## 2021-01-07 PROCEDURE — 2580000003 HC RX 258: Performed by: SURGERY

## 2021-01-07 PROCEDURE — 2500000003 HC RX 250 WO HCPCS: Performed by: NURSE ANESTHETIST, CERTIFIED REGISTERED

## 2021-01-07 PROCEDURE — 6370000000 HC RX 637 (ALT 250 FOR IP): Performed by: SURGERY

## 2021-01-07 PROCEDURE — 2709999900 HC NON-CHARGEABLE SUPPLY: Performed by: SURGERY

## 2021-01-07 PROCEDURE — 2580000003 HC RX 258: Performed by: NURSE ANESTHETIST, CERTIFIED REGISTERED

## 2021-01-07 PROCEDURE — 3600000002 HC SURGERY LEVEL 2 BASE: Performed by: SURGERY

## 2021-01-07 PROCEDURE — 0FT44ZZ RESECTION OF GALLBLADDER, PERCUTANEOUS ENDOSCOPIC APPROACH: ICD-10-PCS | Performed by: SURGERY

## 2021-01-07 PROCEDURE — 6360000002 HC RX W HCPCS: Performed by: NURSE PRACTITIONER

## 2021-01-07 PROCEDURE — 2720000010 HC SURG SUPPLY STERILE: Performed by: SURGERY

## 2021-01-07 PROCEDURE — 3600000012 HC SURGERY LEVEL 2 ADDTL 15MIN: Performed by: SURGERY

## 2021-01-07 PROCEDURE — 94760 N-INVAS EAR/PLS OXIMETRY 1: CPT

## 2021-01-07 PROCEDURE — 3700000000 HC ANESTHESIA ATTENDED CARE: Performed by: SURGERY

## 2021-01-07 PROCEDURE — 82248 BILIRUBIN DIRECT: CPT

## 2021-01-07 PROCEDURE — 7100000000 HC PACU RECOVERY - FIRST 15 MIN: Performed by: SURGERY

## 2021-01-07 PROCEDURE — 80053 COMPREHEN METABOLIC PANEL: CPT

## 2021-01-07 PROCEDURE — 36415 COLL VENOUS BLD VENIPUNCTURE: CPT

## 2021-01-07 PROCEDURE — 3700000001 HC ADD 15 MINUTES (ANESTHESIA): Performed by: SURGERY

## 2021-01-07 PROCEDURE — 6360000002 HC RX W HCPCS: Performed by: SURGERY

## 2021-01-07 PROCEDURE — 6360000002 HC RX W HCPCS: Performed by: NURSE ANESTHETIST, CERTIFIED REGISTERED

## 2021-01-07 PROCEDURE — C9113 INJ PANTOPRAZOLE SODIUM, VIA: HCPCS | Performed by: NURSE PRACTITIONER

## 2021-01-07 PROCEDURE — 7100000001 HC PACU RECOVERY - ADDTL 15 MIN: Performed by: SURGERY

## 2021-01-07 PROCEDURE — 2700000000 HC OXYGEN THERAPY PER DAY

## 2021-01-07 PROCEDURE — 88304 TISSUE EXAM BY PATHOLOGIST: CPT

## 2021-01-07 PROCEDURE — 85027 COMPLETE CBC AUTOMATED: CPT

## 2021-01-07 PROCEDURE — 2500000003 HC RX 250 WO HCPCS: Performed by: SURGERY

## 2021-01-07 PROCEDURE — 83690 ASSAY OF LIPASE: CPT

## 2021-01-07 PROCEDURE — 2780000010 HC IMPLANT OTHER: Performed by: SURGERY

## 2021-01-07 PROCEDURE — 47562 LAPAROSCOPIC CHOLECYSTECTOMY: CPT | Performed by: SURGERY

## 2021-01-07 PROCEDURE — 6370000000 HC RX 637 (ALT 250 FOR IP): Performed by: NURSE PRACTITIONER

## 2021-01-07 PROCEDURE — 1200000000 HC SEMI PRIVATE

## 2021-01-07 PROCEDURE — 2580000003 HC RX 258: Performed by: NURSE PRACTITIONER

## 2021-01-07 RX ORDER — GLYCOPYRROLATE 1 MG/5 ML
SYRINGE (ML) INTRAVENOUS PRN
Status: DISCONTINUED | OUTPATIENT
Start: 2021-01-07 | End: 2021-01-07 | Stop reason: SDUPTHER

## 2021-01-07 RX ORDER — PROMETHAZINE HYDROCHLORIDE 25 MG/1
12.5 TABLET ORAL EVERY 6 HOURS PRN
Status: DISCONTINUED | OUTPATIENT
Start: 2021-01-07 | End: 2021-01-08 | Stop reason: HOSPADM

## 2021-01-07 RX ORDER — SODIUM CHLORIDE 9 MG/ML
INJECTION, SOLUTION INTRAVENOUS CONTINUOUS PRN
Status: DISCONTINUED | OUTPATIENT
Start: 2021-01-07 | End: 2021-01-07 | Stop reason: SDUPTHER

## 2021-01-07 RX ORDER — SODIUM CHLORIDE 9 MG/ML
INJECTION, SOLUTION INTRAVENOUS CONTINUOUS
Status: DISCONTINUED | OUTPATIENT
Start: 2021-01-07 | End: 2021-01-08

## 2021-01-07 RX ORDER — HYDROCODONE BITARTRATE AND ACETAMINOPHEN 5; 325 MG/1; MG/1
2 TABLET ORAL EVERY 4 HOURS PRN
Status: DISCONTINUED | OUTPATIENT
Start: 2021-01-07 | End: 2021-01-08 | Stop reason: HOSPADM

## 2021-01-07 RX ORDER — MORPHINE SULFATE 2 MG/ML
2 INJECTION, SOLUTION INTRAMUSCULAR; INTRAVENOUS
Status: DISCONTINUED | OUTPATIENT
Start: 2021-01-07 | End: 2021-01-08 | Stop reason: HOSPADM

## 2021-01-07 RX ORDER — BUPIVACAINE HYDROCHLORIDE 5 MG/ML
INJECTION, SOLUTION EPIDURAL; INTRACAUDAL PRN
Status: DISCONTINUED | OUTPATIENT
Start: 2021-01-07 | End: 2021-01-07 | Stop reason: ALTCHOICE

## 2021-01-07 RX ORDER — SODIUM CHLORIDE 0.9 % (FLUSH) 0.9 %
10 SYRINGE (ML) INJECTION EVERY 12 HOURS SCHEDULED
Status: DISCONTINUED | OUTPATIENT
Start: 2021-01-07 | End: 2021-01-08 | Stop reason: HOSPADM

## 2021-01-07 RX ORDER — LABETALOL HYDROCHLORIDE 5 MG/ML
INJECTION, SOLUTION INTRAVENOUS PRN
Status: DISCONTINUED | OUTPATIENT
Start: 2021-01-07 | End: 2021-01-07 | Stop reason: SDUPTHER

## 2021-01-07 RX ORDER — NEOSTIGMINE METHYLSULFATE 5 MG/5 ML
SYRINGE (ML) INTRAVENOUS PRN
Status: DISCONTINUED | OUTPATIENT
Start: 2021-01-07 | End: 2021-01-07 | Stop reason: SDUPTHER

## 2021-01-07 RX ORDER — FENTANYL CITRATE 50 UG/ML
INJECTION, SOLUTION INTRAMUSCULAR; INTRAVENOUS PRN
Status: DISCONTINUED | OUTPATIENT
Start: 2021-01-07 | End: 2021-01-07 | Stop reason: SDUPTHER

## 2021-01-07 RX ORDER — ONDANSETRON 2 MG/ML
4 INJECTION INTRAMUSCULAR; INTRAVENOUS EVERY 6 HOURS PRN
Status: DISCONTINUED | OUTPATIENT
Start: 2021-01-07 | End: 2021-01-08 | Stop reason: HOSPADM

## 2021-01-07 RX ORDER — ONDANSETRON 2 MG/ML
INJECTION INTRAMUSCULAR; INTRAVENOUS PRN
Status: DISCONTINUED | OUTPATIENT
Start: 2021-01-07 | End: 2021-01-07 | Stop reason: SDUPTHER

## 2021-01-07 RX ORDER — MORPHINE SULFATE 4 MG/ML
4 INJECTION, SOLUTION INTRAMUSCULAR; INTRAVENOUS
Status: DISCONTINUED | OUTPATIENT
Start: 2021-01-07 | End: 2021-01-08 | Stop reason: HOSPADM

## 2021-01-07 RX ORDER — HYOSCYAMINE SULFATE 0.125 MG
125 TABLET,DISINTEGRATING ORAL EVERY 4 HOURS PRN
Status: DISCONTINUED | OUTPATIENT
Start: 2021-01-07 | End: 2021-01-08 | Stop reason: HOSPADM

## 2021-01-07 RX ORDER — HYDROCODONE BITARTRATE AND ACETAMINOPHEN 5; 325 MG/1; MG/1
1 TABLET ORAL EVERY 4 HOURS PRN
Status: DISCONTINUED | OUTPATIENT
Start: 2021-01-07 | End: 2021-01-08 | Stop reason: HOSPADM

## 2021-01-07 RX ORDER — ROCURONIUM BROMIDE 10 MG/ML
INJECTION, SOLUTION INTRAVENOUS PRN
Status: DISCONTINUED | OUTPATIENT
Start: 2021-01-07 | End: 2021-01-07 | Stop reason: SDUPTHER

## 2021-01-07 RX ORDER — DEXAMETHASONE SODIUM PHOSPHATE 10 MG/ML
INJECTION, EMULSION INTRAMUSCULAR; INTRAVENOUS PRN
Status: DISCONTINUED | OUTPATIENT
Start: 2021-01-07 | End: 2021-01-07 | Stop reason: SDUPTHER

## 2021-01-07 RX ORDER — INDOCYANINE GREEN AND WATER 25 MG
KIT INJECTION PRN
Status: DISCONTINUED | OUTPATIENT
Start: 2021-01-07 | End: 2021-01-07 | Stop reason: SDUPTHER

## 2021-01-07 RX ORDER — PROPOFOL 10 MG/ML
INJECTION, EMULSION INTRAVENOUS PRN
Status: DISCONTINUED | OUTPATIENT
Start: 2021-01-07 | End: 2021-01-07 | Stop reason: SDUPTHER

## 2021-01-07 RX ORDER — SODIUM CHLORIDE 0.9 % (FLUSH) 0.9 %
10 SYRINGE (ML) INJECTION PRN
Status: DISCONTINUED | OUTPATIENT
Start: 2021-01-07 | End: 2021-01-08 | Stop reason: HOSPADM

## 2021-01-07 RX ADMIN — SODIUM CHLORIDE: 9 INJECTION, SOLUTION INTRAVENOUS at 08:53

## 2021-01-07 RX ADMIN — FENTANYL CITRATE 100 MCG: 50 INJECTION INTRAMUSCULAR; INTRAVENOUS at 09:35

## 2021-01-07 RX ADMIN — LABETALOL HYDROCHLORIDE 5 MG: 5 INJECTION INTRAVENOUS at 09:45

## 2021-01-07 RX ADMIN — MORPHINE SULFATE 2 MG: 2 INJECTION, SOLUTION INTRAMUSCULAR; INTRAVENOUS at 03:32

## 2021-01-07 RX ADMIN — FENTANYL CITRATE 50 MCG: 50 INJECTION INTRAMUSCULAR; INTRAVENOUS at 10:01

## 2021-01-07 RX ADMIN — FENTANYL CITRATE 100 MCG: 50 INJECTION INTRAMUSCULAR; INTRAVENOUS at 08:58

## 2021-01-07 RX ADMIN — PIPERACILLIN AND TAZOBACTAM 3.38 G: 3; .375 INJECTION, POWDER, LYOPHILIZED, FOR SOLUTION INTRAVENOUS at 01:30

## 2021-01-07 RX ADMIN — PIPERACILLIN AND TAZOBACTAM 3.38 G: 3; .375 INJECTION, POWDER, LYOPHILIZED, FOR SOLUTION INTRAVENOUS at 08:06

## 2021-01-07 RX ADMIN — PROPOFOL 200 MG: 10 INJECTION, EMULSION INTRAVENOUS at 08:58

## 2021-01-07 RX ADMIN — Medication 5 MG: at 09:54

## 2021-01-07 RX ADMIN — ACETAMINOPHEN 650 MG: 325 TABLET ORAL at 03:34

## 2021-01-07 RX ADMIN — HYDROCODONE BITARTRATE AND ACETAMINOPHEN 2 TABLET: 5; 325 TABLET ORAL at 11:32

## 2021-01-07 RX ADMIN — PIPERACILLIN AND TAZOBACTAM 3.38 G: 3; .375 INJECTION, POWDER, LYOPHILIZED, FOR SOLUTION INTRAVENOUS at 09:04

## 2021-01-07 RX ADMIN — HYDROCODONE BITARTRATE AND ACETAMINOPHEN 2 TABLET: 5; 325 TABLET ORAL at 16:20

## 2021-01-07 RX ADMIN — MORPHINE SULFATE 2 MG: 2 INJECTION, SOLUTION INTRAMUSCULAR; INTRAVENOUS at 12:50

## 2021-01-07 RX ADMIN — ROCURONIUM BROMIDE 50 MG: 10 INJECTION INTRAVENOUS at 08:58

## 2021-01-07 RX ADMIN — DEXAMETHASONE SODIUM PHOSPHATE 5 MG: 10 INJECTION, EMULSION INTRAMUSCULAR; INTRAVENOUS at 09:05

## 2021-01-07 RX ADMIN — SODIUM CHLORIDE: 9 INJECTION, SOLUTION INTRAVENOUS at 11:15

## 2021-01-07 RX ADMIN — INDOCYANINE GREEN AND WATER 5 MG: KIT at 08:45

## 2021-01-07 RX ADMIN — FENTANYL CITRATE 50 MCG: 50 INJECTION INTRAMUSCULAR; INTRAVENOUS at 09:54

## 2021-01-07 RX ADMIN — HYDROCODONE BITARTRATE AND ACETAMINOPHEN 1 TABLET: 5; 325 TABLET ORAL at 21:31

## 2021-01-07 RX ADMIN — ONDANSETRON HYDROCHLORIDE 4 MG: 4 INJECTION, SOLUTION INTRAMUSCULAR; INTRAVENOUS at 09:48

## 2021-01-07 RX ADMIN — Medication 0.8 MG: at 09:54

## 2021-01-07 RX ADMIN — FENTANYL CITRATE 50 MCG: 50 INJECTION INTRAMUSCULAR; INTRAVENOUS at 09:08

## 2021-01-07 RX ADMIN — Medication 10 ML: at 07:52

## 2021-01-07 RX ADMIN — PIPERACILLIN AND TAZOBACTAM 3.38 G: 3; .375 INJECTION, POWDER, LYOPHILIZED, FOR SOLUTION INTRAVENOUS at 17:08

## 2021-01-07 RX ADMIN — PANTOPRAZOLE SODIUM 40 MG: 40 INJECTION, POWDER, FOR SOLUTION INTRAVENOUS at 07:51

## 2021-01-07 ASSESSMENT — PULMONARY FUNCTION TESTS
PIF_VALUE: 23
PIF_VALUE: 22
PIF_VALUE: 15
PIF_VALUE: 19
PIF_VALUE: 19
PIF_VALUE: 18
PIF_VALUE: 19
PIF_VALUE: 20
PIF_VALUE: 24
PIF_VALUE: 22
PIF_VALUE: 23
PIF_VALUE: 15
PIF_VALUE: 18
PIF_VALUE: 1
PIF_VALUE: 21
PIF_VALUE: 19
PIF_VALUE: 22
PIF_VALUE: 15
PIF_VALUE: 19
PIF_VALUE: 20
PIF_VALUE: 23
PIF_VALUE: 19
PIF_VALUE: 2
PIF_VALUE: 24
PIF_VALUE: 22
PIF_VALUE: 19
PIF_VALUE: 23
PIF_VALUE: 22
PIF_VALUE: 22
PIF_VALUE: 23
PIF_VALUE: 22
PIF_VALUE: 19
PIF_VALUE: 8
PIF_VALUE: 22
PIF_VALUE: 24

## 2021-01-07 ASSESSMENT — PAIN SCALES - GENERAL
PAINLEVEL_OUTOF10: 7
PAINLEVEL_OUTOF10: 6
PAINLEVEL_OUTOF10: 4
PAINLEVEL_OUTOF10: 4
PAINLEVEL_OUTOF10: 5
PAINLEVEL_OUTOF10: 4
PAINLEVEL_OUTOF10: 5
PAINLEVEL_OUTOF10: 3

## 2021-01-07 ASSESSMENT — PAIN DESCRIPTION - ORIENTATION: ORIENTATION: RIGHT

## 2021-01-07 NOTE — BRIEF OP NOTE
Brief Postoperative Note      Patient: Rupinder Benavides  YOB: 1950  MRN: [id number]    Date of Procedure: 1/7/2021    Pre-Op Diagnosis: Acute calculus cholecystitis    Post-Op Diagnosis: Same       Procedure(s):  LAPAROSCOPIC CHOLECYSTECTOMY    Surgeon(s):  Sophie Garcia MD    Assistant:  First Assistant: Samuel Engel RN    Anesthesia: General/Local    Estimated Blood Loss (mL): 50 ml    Complications: None    Specimens:   ID Type Source Tests Collected by Time Destination   A : gallbladder Tissue Gallbladder SURGICAL PATHOLOGY Sophie Garcia MD 1/7/2021 7341        Implants:  * No implants in log *      Drains: * No LDAs found *    Findings: as above - see op note for details    Electronically signed by Sophie Garcia MD on 1/7/2021 at 10:04 AM

## 2021-01-07 NOTE — PROGRESS NOTES
Comprehensive Nutrition Assessment    Type and Reason for Visit:  Initial, Positive Nutrition Screen(poor oral intake, unplanned weight loss prior to admit)    Nutrition Recommendations/Plan:   Diet advancement as able. ONS initiated: Ensure Enlive TID. Consider MVI. Nutrition Assessment:    Pt. nutritionally compromised AEB reports of poor appetite/intake due to abdominal pain, nausea/vomiting for ~ 7 days. At risk for further nutrition compromise r/t continued poor appetite, admit with gallstones, s/p lap vaughn 1/7/20, increased nutrient needs to support post-op wound healing, and underlying medical condition (hx: HTN). Nutrition recommendations/interventions as per above. Malnutrition Assessment:  Malnutrition Status: At risk for malnutrition (Comment)    Context:  Acute Illness     Findings of the 6 clinical characteristics of malnutrition:  Energy Intake:  1 - 75% or less of estimated energy requirements for 7 or more days  Weight Loss:  Unable to assess(Previous weights likely stated in EMR, no actual weight)     Body Fat Loss:  No significant body fat loss     Muscle Mass Loss:  No significant muscle mass loss    Fluid Accumulation:  No significant fluid accumulation     Strength:  Not Performed    Estimated Daily Nutrient Needs:  Energy (kcal):  0271-9700 kcals (15-18 kcals/kg/day); Weight Used for Energy Requirements:  Current(106 kg 1/5/20 stated)     Protein (g):  84 grams (1 gram/kgIBW/day) or more pending renal and wound healing status; Weight Used for Protein Requirements:  Ideal(84 kg)          Nutrition Related Findings:  S/P lap vaughn today. Pt and wife seen. Pt sleepy, reports abdominal pain but denied nausea. Reports has a few GB attacks over the last 2 years but generally subsided quickely, this last attack started~ 1 week ago, poor appetite/intake since then. Unsure of weight loss as per pt and wife he hasn't been weighed recently to know, admit weight was stated.   He is on full liquid diet, hasn't tried anything yet today. He was agreeable to ONS trial, provided 2 bottles to keep at bedside to trial.  1/7/20: BUN 34, Creatinine 1.4, Glucose 146. Rx: IVF at 50 ml/hr, zofran, phenergan, colace BID prn, hydrodiuril, zosyn. Wounds:  Surgical Incision(1/7 s/p lap vaughn, 5 port incisional sites-abdomen)       Current Nutrition Therapies:    DIET FULL LIQUID;  Dietary Nutrition Supplements: Standard High Calorie Oral Supplement    Anthropometric Measures:  · Height: 6' 1\" (185.4 cm)  · Current Body Weight: 234 lb (106.1 kg)(1/5/20 stated, no edema)   · Admission Body Weight: 234 lb (106.1 kg)(1/5/20 stated, no edema)    · Usual Body Weight: (230-234# per pt. Per EMR: 3/4/20: 238#, 9/1/20: 243#, 1/4/21: 234#-suspect most weights likely stated as pt state he hasn't actually been weighed in a long time)     · Ideal Body Weight: 184 lbs;   · BMI: 30.9  · Adjusted Body Weight:  ; No Adjustment   · BMI Categories: Obese Class 1 (BMI 30.0-34. 9)       Nutrition Diagnosis:   · Inadequate oral intake related to inadequate protein-energy intake as evidenced by poor intake prior to admission, intake 0-25%      Nutrition Interventions:   Food and/or Nutrient Delivery:  Start Oral Nutrition Supplement, Continue Current Diet  Nutrition Education/Counseling:  Education initiated(Encouraged oral intake, good protein sources, and ONS use.)   Coordination of Nutrition Care:  Continue to monitor while inpatient    Goals:  Pt will tolerate current diet and consume 75% or more of meals to support post-op healing. Nutrition Monitoring and Evaluation:   Behavioral-Environmental Outcomes:  None Identified   Food/Nutrient Intake Outcomes:  Diet Advancement/Tolerance, Food and Nutrient Intake, Supplement Intake, IVF Intake  Physical Signs/Symptoms Outcomes:  Biochemical Data, GI Status, Fluid Status or Edema, Nutrition Focused Physical Findings, Skin, Weight     Discharge Planning:     Too soon to determine     Electronically signed by Guido Fortune RD, LD on 1/7/21 at 3:44 PM EST    Contact: [phone]

## 2021-01-07 NOTE — ANESTHESIA POSTPROCEDURE EVALUATION
Department of Anesthesiology  Postprocedure Note    Patient: Soledad Smith  MRN: [id number]  YOB: 1950  Date of evaluation: 1/7/2021  Time:  1:19 PM     Procedure Summary     Date: 01/07/21 Room / [facility]: [facility]    Anesthesia Start: 4320 Anesthesia Stop: 1004    Procedure: LAPAROSCOPIC CHOLECYSTECTOMY (N/A Abdomen) Diagnosis: (CHOLELITHIASIS)    Surgeons: Pati Brito MD Responsible Provider: Ganga Hyatt DO    Anesthesia Type: general ASA Status: 2          Anesthesia Type: No value filed. Lionel Phase I: Lionel Score: 9    Lionel Phase II:      Last vitals: Reviewed and per EMR flowsheets.        Anesthesia Post Evaluation    Patient location during evaluation: PACU  Patient participation: complete - patient participated  Level of consciousness: awake and alert  Pain score: 3  Airway patency: patent  Nausea & Vomiting: no nausea and no vomiting  Complications: no  Cardiovascular status: hemodynamically stable and blood pressure returned to baseline  Respiratory status: spontaneous ventilation, acceptable and nasal cannula  Hydration status: stable

## 2021-01-07 NOTE — PROGRESS NOTES
Patient returned from surgical procedure. Awake and alert and oriented x4. SCD's on bilateral lower extremities. Oxygen per NC at 3 L. Following commands well. Patient states pain is 4/10 on pain scale. 0.9 NS infusing into the left hand at 125ml per hour with 800ml left in bag. IV site clean dry and intact. Abdominal surgical sites x5 covered with steri strips and band-aids. Sites clean dry and intact. Abdominal area tender. Ice ships given. Wife at bedside.

## 2021-01-07 NOTE — ANESTHESIA PRE PROCEDURE
Department of Anesthesiology  Preprocedure Note       Name:  Ania Niño   Age:  79 y.o.  :  1950                                          MRN:  [id number]         Date:  2021      Surgeon: Cesario Yañez):  Yoli Pichardo MD    Procedure: Procedure(s):  LAPAROSCOPIC CHOLECYSTECTOMY, POSS OPEN    Medications prior to admission:   Prior to Admission medications    Medication Sig Start Date End Date Taking? Authorizing Provider   ondansetron (ZOFRAN ODT) 4 MG disintegrating tablet Take 1 tablet by mouth every 8 hours as needed for Nausea or Vomiting 21   Rosalinda Hawley MD   HYDROcodone-acetaminophen (NORCO) 5-325 MG per tablet Take 1 tablet by mouth every 6 hours as needed for Pain for up to 5 days.  21  Rosalinda Hawley MD   lisinopril-hydroCHLOROthiazide (PRINZIDE;ZESTORETIC) 20-25 MG per tablet take 1 tablet by mouth twice a day 3/4/20   Chai Lopez MD       Current medications:    Current Facility-Administered Medications   Medication Dose Route Frequency Provider Last Rate Last Admin    docusate sodium (COLACE) capsule 100 mg  100 mg Oral BID PRN Denis Masters, APRN - CNP        LORazepam (ATIVAN) injection 0.5 mg  0.5 mg Intravenous Nightly PRN Denis Masters, APRN - CNP        acetaminophen (TYLENOL) tablet 650 mg  650 mg Oral Q4H PRN Denis Masters, APRN - CNP   650 mg at 21 0334    sodium chloride flush 0.9 % injection 10 mL  10 mL Intravenous 2 times per day Denis Masters, APRN - CNP   10 mL at 21 0752    sodium chloride flush 0.9 % injection 10 mL  10 mL Intravenous PRN Denis Masters, APRN - CNP   10 mL at 21 1622    ondansetron (ZOFRAN-ODT) disintegrating tablet 4 mg  4 mg Oral Q8H PRN Denis Masters, APRN - CNP        Or    ondansetron [facility]) injection 4 mg  4 mg Intravenous Q6H PRN Denis Masters, APRN - CNP        morphine (PF) injection 2 mg  2 mg Intravenous Q2H PRN Denis Masters, APRN - CNP   2 mg at 21 6196 Or    morphine injection 4 mg  4 mg Intravenous Q2H PRN Clabe Brand, APRN - CNP        0.9 % sodium chloride infusion   Intravenous Continuous Clabe Brand, APRN - CNP 75 mL/hr at 01/06/21 1640 New Bag at 01/06/21 1640    HYDROcodone-acetaminophen (NORCO) 5-325 MG per tablet 1 tablet  1 tablet Oral Q4H PRN Clabe Brand, APRN - CNP   1 tablet at 01/06/21 0215    Or    HYDROcodone-acetaminophen (NORCO) 5-325 MG per tablet 2 tablet  2 tablet Oral Q4H PRN Clabe Brand, APRN - CNP        piperacillin-tazobactam (ZOSYN) 3.375 g in dextrose 5 % 50 mL IVPB extended infusion (mini-bag)  3.375 g Intravenous Q8H Clabe Brand, APRN - CNP 12.5 mL/hr at 01/07/21 0806 3.375 g at 01/07/21 0806    pantoprazole (PROTONIX) injection 40 mg  40 mg Intravenous Daily Clabe Brand, APRN - CNP   40 mg at 01/07/21 9806    And    sodium chloride (PF) 0.9 % injection 10 mL  10 mL Intravenous Daily Clabe Brand, APRN - CNP   10 mL at 01/06/21 0836    lisinopril (PRINIVIL;ZESTRIL) tablet 20 mg  20 mg Oral Daily Clabe Brand, APRN - CNP   20 mg at 01/06/21 4246    And    hydroCHLOROthiazide (HYDRODIURIL) tablet 25 mg  25 mg Oral Daily Clabe Brand, APRN - CNP   25 mg at 01/06/21 0466       Allergies:  No Known Allergies    Problem List:    Patient Active Problem List   Diagnosis Code    Essential hypertension I10    Gallstones K80.20       Past Medical History:        Diagnosis Date    Hypertension        Past Surgical History:        Procedure Laterality Date    EYE SURGERY      cataracts removal     FRACTURE SURGERY  1980s    Right arm     SHOULDER SURGERY         Social History:    Social History     Tobacco Use    Smoking status: Never Smoker    Smokeless tobacco: Never Used   Substance Use Topics    Alcohol use: Yes     Comment: occassionally                                Counseling given: Not Answered      Vital Signs (Current):   Vitals: 01/06/21 1746 01/06/21 1930 01/07/21 0332 01/07/21 0730   BP:  (!) 143/79 (!) 148/88 139/81   Pulse:  71 72 71   Resp:  18 18 16   Temp: 100.8 °F (38.2 °C) 99.8 °F (37.7 °C) 100.8 °F (38.2 °C) 99.1 °F (37.3 °C)   TempSrc: Oral Oral Oral Oral   SpO2:  96% 97% 93%   Weight:       Height:                                                  BP Readings from Last 3 Encounters:   01/07/21 139/81   01/07/21 (!) 94/58   01/05/21 (!) 140/63       NPO Status:                                                                                 BMI:   Wt Readings from Last 3 Encounters:   01/05/21 234 lb (106.1 kg)   01/04/21 234 lb (106.1 kg)   09/01/20 243 lb (110.2 kg)     Body mass index is 30.87 kg/m². CBC:   Lab Results   Component Value Date    WBC 10.2 01/07/2021    RBC 4.74 01/07/2021    HGB 13.4 01/07/2021    HCT 41.2 01/07/2021    MCV 86.9 01/07/2021    RDW 13.6 01/04/2021     01/07/2021       CMP:   Lab Results   Component Value Date     01/07/2021    K 4.2 01/07/2021     01/07/2021    CO2 25 01/07/2021    BUN 34 01/07/2021    CREATININE 1.4 01/07/2021    LABGLOM 50 01/07/2021    GLUCOSE 146 01/07/2021    PROT 6.2 01/07/2021    CALCIUM 8.9 01/07/2021    BILITOT 1.8 01/07/2021    ALKPHOS 46 01/07/2021    AST 16 01/07/2021    ALT 14 01/07/2021       POC Tests: No results for input(s): POCGLU, POCNA, POCK, POCCL, POCBUN, POCHEMO, POCHCT in the last 72 hours.     Coags: No results found for: PROTIME, INR, APTT    HCG (If Applicable): No results found for: PREGTESTUR, PREGSERUM, HCG, HCGQUANT     ABGs: No results found for: PHART, PO2ART, PON8LGQ, SBM7NHZ, BEART, M3YQGZNW     Type & Screen (If Applicable):  No results found for: LABABO, LABRH    Drug/Infectious Status (If Applicable):  No results found for: HIV, HEPCAB    COVID-19 Screening (If Applicable):   Lab Results   Component Value Date    COVID19 NOT DETECTED 01/05/2021         Anesthesia Evaluation Patient summary reviewed and Nursing notes reviewed no history of anesthetic complications:   Airway: Mallampati: II  TM distance: >3 FB   Neck ROM: full  Mouth opening: > = 3 FB Dental:          Pulmonary:Negative Pulmonary ROS and normal exam  breath sounds clear to auscultation                             Cardiovascular:  Exercise tolerance: good (>4 METS),   (+) hypertension:,       ECG reviewed                        Neuro/Psych:   Negative Neuro/Psych ROS              GI/Hepatic/Renal: Neg GI/Hepatic/Renal ROS            Endo/Other: Negative Endo/Other ROS             Pt had no PAT visit       Abdominal:   (+) obese,     Abdomen: soft. Vascular: negative vascular ROS. Anesthesia Plan      general     ASA 2       Induction: intravenous. MIPS: Postoperative opioids intended and Prophylactic antiemetics administered. Anesthetic plan and risks discussed with patient and spouse. Plan discussed with CRNA.                   [address],    1/7/2021

## 2021-01-08 VITALS
HEART RATE: 67 BPM | SYSTOLIC BLOOD PRESSURE: 147 MMHG | HEIGHT: 73 IN | BODY MASS INDEX: 31.01 KG/M2 | OXYGEN SATURATION: 94 % | RESPIRATION RATE: 16 BRPM | DIASTOLIC BLOOD PRESSURE: 80 MMHG | TEMPERATURE: 98.9 F | WEIGHT: 234 LBS

## 2021-01-08 PROBLEM — K80.20 GALLSTONES: Status: RESOLVED | Noted: 2021-01-05 | Resolved: 2021-01-08

## 2021-01-08 LAB
ANION GAP SERPL CALCULATED.3IONS-SCNC: 10 MEQ/L (ref 8–16)
BUN BLDV-MCNC: 32 MG/DL (ref 7–22)
CALCIUM SERPL-MCNC: 8.8 MG/DL (ref 8.5–10.5)
CHLORIDE BLD-SCNC: 100 MEQ/L (ref 98–111)
CO2: 25 MEQ/L (ref 23–33)
CREAT SERPL-MCNC: 1.2 MG/DL (ref 0.4–1.2)
GFR SERPL CREATININE-BSD FRML MDRD: 60 ML/MIN/1.73M2
GLUCOSE BLD-MCNC: 156 MG/DL (ref 70–108)
HCT VFR BLD CALC: 40 % (ref 42–52)
HEMOGLOBIN: 13.1 GM/DL (ref 14–18)
POTASSIUM REFLEX MAGNESIUM: 4.3 MEQ/L (ref 3.5–5.2)
SODIUM BLD-SCNC: 135 MEQ/L (ref 135–145)

## 2021-01-08 PROCEDURE — 6360000002 HC RX W HCPCS: Performed by: SURGERY

## 2021-01-08 PROCEDURE — 36415 COLL VENOUS BLD VENIPUNCTURE: CPT

## 2021-01-08 PROCEDURE — 85018 HEMOGLOBIN: CPT

## 2021-01-08 PROCEDURE — C9113 INJ PANTOPRAZOLE SODIUM, VIA: HCPCS | Performed by: SURGERY

## 2021-01-08 PROCEDURE — 80048 BASIC METABOLIC PNL TOTAL CA: CPT

## 2021-01-08 PROCEDURE — 99024 POSTOP FOLLOW-UP VISIT: CPT | Performed by: NURSE PRACTITIONER

## 2021-01-08 PROCEDURE — 6370000000 HC RX 637 (ALT 250 FOR IP): Performed by: SURGERY

## 2021-01-08 PROCEDURE — 85014 HEMATOCRIT: CPT

## 2021-01-08 PROCEDURE — 2580000003 HC RX 258: Performed by: SURGERY

## 2021-01-08 RX ORDER — AMOXICILLIN AND CLAVULANATE POTASSIUM 875; 125 MG/1; MG/1
1 TABLET, FILM COATED ORAL 2 TIMES DAILY
Qty: 14 TABLET | Refills: 0 | Status: SHIPPED | OUTPATIENT
Start: 2021-01-08 | End: 2021-01-15

## 2021-01-08 RX ADMIN — PIPERACILLIN AND TAZOBACTAM 3.38 G: 3; .375 INJECTION, POWDER, LYOPHILIZED, FOR SOLUTION INTRAVENOUS at 01:19

## 2021-01-08 RX ADMIN — PANTOPRAZOLE SODIUM 40 MG: 40 INJECTION, POWDER, FOR SOLUTION INTRAVENOUS at 08:17

## 2021-01-08 RX ADMIN — ENOXAPARIN SODIUM 40 MG: 40 INJECTION SUBCUTANEOUS at 08:20

## 2021-01-08 RX ADMIN — Medication 10 ML: at 08:17

## 2021-01-08 RX ADMIN — ACETAMINOPHEN 650 MG: 325 TABLET ORAL at 11:27

## 2021-01-08 RX ADMIN — LISINOPRIL 20 MG: 20 TABLET ORAL at 08:17

## 2021-01-08 RX ADMIN — PIPERACILLIN AND TAZOBACTAM 3.38 G: 3; .375 INJECTION, POWDER, LYOPHILIZED, FOR SOLUTION INTRAVENOUS at 08:20

## 2021-01-08 RX ADMIN — HYDROCHLOROTHIAZIDE 25 MG: 25 TABLET ORAL at 08:17

## 2021-01-08 ASSESSMENT — PAIN SCALES - GENERAL: PAINLEVEL_OUTOF10: 3

## 2021-01-08 ASSESSMENT — PAIN DESCRIPTION - PAIN TYPE: TYPE: ACUTE PAIN

## 2021-01-08 ASSESSMENT — PAIN DESCRIPTION - LOCATION: LOCATION: ABDOMEN

## 2021-01-08 ASSESSMENT — PAIN DESCRIPTION - DESCRIPTORS: DESCRIPTORS: DISCOMFORT

## 2021-01-08 NOTE — OP NOTE
[address] [place] [address]                                OPERATIVE REPORT    PATIENT NAME: Анна Oseguera                      :        1950  MED REC NO:   [id number]                           ROOM:       0017  ACCOUNT NO:   [de-identified]                           ADMIT DATE: 2021  PROVIDER:     JOVANA Parks [place]:  2021    PREOPERATIVE DIAGNOSIS:  Acute calculous cholecystitis. POSTOPERATIVE DIAGNOSIS:  Acute calculous cholecystitis. PROCEDURE:  Laparoscopic cholecystectomy. SURGEON:  Rome Valdes MD    ASSISTANT:  Neetu Avendaño. Joy, [facility]    ANESTHESIA:  General/local.    ESTIMATED BLOOD LOSS:  50 mL. DRAINS:  None. COMPLICATIONS:  None. DISPOSITION:  Stable to the recovery room. INDICATIONS:  The patient is a 25-year-old male, who was admitted to the  hospital secondary to gallbladder disease. He was found to have acute  calculous disease. Common bile duct looked within normal limits on  MRCP. Both operative and nonoperative intervention plans were  discussed. Risks of surgery were further discussed. Some of the risks  included, but were not limited to, bleeding, infection, the need for  reoperation, severe chronic postoperative pain or numbness, major  vascular or nerve injury, cardiopulmonary complications, anesthetic  complications, seroma/hematoma formation, wound breakdown, bile leak,  bile duct injury, biloma formation, chronic pain and death. After all  of the questions were answered in their entirety and the patient was  completely aware of the current situation, he elected to proceed with  the procedure. DESCRIPTION OF PROCEDURE:  After informed consent was signed and placed  on the chart, the patient was taken back to the operating room and  placed supine on the operating room table.   General anesthesia was induced. He tolerated this well throughout the case. All pressure  points were padded. He was on preoperative antibiotics. Bilateral  lower extremity sequential compression devices were placed prior to  incision. His abdomen and pelvis were prepped and draped in the usual  sterile standard fashion. A timeout occurred prior to the operation,  which not only identified the patient, but also the planned procedure to  be performed. At the end of the timeout, there were no questions or  concerns. I began the operation by making a small transverse incision  just above the umbilicus. Fascia was elevated and Veress was needle  inserted. Intra-abdominal cavity was insufflated to a pressure of  approximately 15 mmHg with carbon dioxide gas. The patient tolerated  the insufflation well. A 5-mm trocar was placed. Laparoscope was  inserted. Upon initial evaluation, there was no hollow viscus, solid  organ or major vascular injury with the Veress needle insertion or the  first trocar placement. Two other 5-mm trocars were placed in the  standard location under direct vision. An 11-mm trocar was then placed  in the subxiphoid position under direct vision. The patient was placed  in reverse Trendelenburg with the right side elevated. On initial  evaluation, the omentum was up over the dome of the liver. This was  gradually taken down with blunt dissection. Gallbladder was tethered to  this pretty severely, which was expected due to the acute disease. Eventually, this was all taken down. I did have to take an 11-mm trocar  and place this in the right lower quadrant, put a large _____ for better  retraction based on the acute disease and the patient's body habitus. This gave much better exposure to the gallbladder. The gallbladder was  suctioned with about 60 mL of dark purulent bile. Fundus was then able  to be grasped and elevated up over the dome of the liver.   Gallbladder

## 2021-01-08 NOTE — PROGRESS NOTES
[facility]  Daily Progress Note    Pt Name: Roxanne Stokes  Medical Record Number: [id number]  Date of Birth 1950   Today's Date: 1/8/2021    Hospital day # 3     ASSESSMENT   1. Acute calculous cholecystitis - POD # 1 Status post laparoscopic cholecystectomy   2. Elevated LFTs  3. Nausea & vomiting - resolved  4. Hypertension  5. Acute kidney injury - resolved    has a past medical history of Hypertension. PLAN   1. Regular diet   2. IV fluids to INT  3. Pain & nausea control  4. Urinating spontaneously   5. Protonix IV  6. Up as kati  7. Antibiotics   8. SCDs for DVT prophylaxis  9. Pathology reviewed  10. Clinically, looks good. Feels much better already. Home after lunch. Discharge instructions discussed. Questions answered. Follow up in 10 days. SUBJECTIVE   Chief complaint: Incisional pain    Patient was stable overnight. Chart reviewed. Updated by nursing staff. No fevers. Took norco last night at 9 pm and nothing since for pain. Tylenol working okay. Tolerating diet. No nausea. Denies chest discomfort or dyspnea. No vomiting. (+) belching, flatus. No BMs. Tolerating Dietary Nutrition Supplements: Standard High Calorie Oral Supplement  DIET GENERAL; diet. Up ad kati. Urinating without complaints. Incisions are clean, dry and intact.   CURRENT MEDICATIONS   Scheduled Meds:   sodium chloride flush  10 mL Intravenous 2 times per day    enoxaparin  40 mg Subcutaneous Daily    piperacillin-tazobactam  3.375 g Intravenous Q8H    pantoprazole  40 mg Intravenous Daily    And    sodium chloride (PF)  10 mL Intravenous Daily    lisinopril  20 mg Oral Daily    And    hydroCHLOROthiazide  25 mg Oral Daily     Continuous Infusions:   sodium chloride 50 mL/hr at 01/07/21 1115     PRN Meds:.hyoscyamine, HYDROcodone 5 mg - acetaminophen **OR** HYDROcodone 5 mg - acetaminophen, sodium chloride flush, promethazine **OR** ondansetron, morphine **OR** morphine, CO2 28 22* 25 25   BUN 31* 35* 34* 32*   CREATININE 1.4* 1.4* 1.4* 1.2   CALCIUM 10.0 9.2 8.9 8.8      No results for input(s): PTT, INR in the last 72 hours. Invalid input(s): PT  Recent Labs     01/05/21  1248 01/06/21  0407 01/07/21  0406   AST 27 23 16   ALT 16 13 14   BILITOT 1.8* 1.7* 1.8*   BILIDIR 0.4* 0.5* 0.7*   AMYLASE 67  --   --    LIPASE 27.4 27.0 24.4     PATHOLOGY REPORT     Clinical Information: CHOLELITHIASIS     FINAL DIAGNOSIS:   Gallbladder, cholecystectomy:    Necrotizing acute and chronic cholecystitis. Specimen:   GALLBLADDER     Gross Examination:   The container is labeled Kathryn Orellana, gallbladder.  Received in   formalin is a gallbladder measuring 6.5 cm in length x 3.5 cm in   diameter.  The surface is hemorrhagic. Gleda Fer is a large   surgically-induced defect in the wall.  No lymph nodes are grossly   identified.  There are no stones.  The wall measures 0.3 cm in   thickness and the mucosal surface is hemorrhagic and ragged.  There is   fibrinous debris.  The cystic duct is patent.  Representative sections   including the cystic duct margin are submitted.  1 ss. MTK/DKR:[id number]     Microscopic Examination:   Microscopic examination was performed. RADIOLOGY   No new imaging    Electronically signed by ROSIE Stark CNP on 1/8/2021 at 8:44 AM     Patient seen and examined independently by me early this AM. Above discussed and I agree with Anna Nazario CNP. See my additional comments below for updated orders and plan. Labs, cultures, and radiographs where available were reviewed. I discussed patient concerns with the patient's nurse and instructions were given. Please see our orders for the updated patient care plan  -Advancing diet. Stop IV fluids. Pain control. Oral antibiotics. Incisional/wound care. Most likely home later today.     Electronically signed by Stanley Bobo MD on 1/8/21 at 4:48 PM EST

## 2021-01-08 NOTE — CARE COORDINATION
Discharge orders on chart. Met with Carlotta Carter and his wife present at bedside. Both are in agreement for discharge to home today with no services added. 1/8/21, 1:41 PM EST    Patient goals/plan/ treatment preferences discussed by  and . Patient goals/plan/ treatment preferences reviewed with patient/ family. Patient/ family verbalize understanding of discharge plan and are in agreement with goal/plan/treatment preferences. Understanding was demonstrated using the teach back method. AVS provided by RN at time of discharge, which includes all necessary medical information pertaining to the patients current course of illness, treatment, post-discharge goals of care, and treatment preferences. IMM Letter  IMM Letter given to Patient/Family/Significant other/Guardian/POA/by[de-identified] Copydelivered to patient by Mgr. Tran  IMM Letter date given[de-identified] 01/08/21  IMM Letter time given[de-identified] 1050

## 2021-01-08 NOTE — FLOWSHEET NOTE
Assessment- was rounding in the unit to provide spiritual care and emotional support. In addition, this  was rounding to assess any other needs that were present. I encountered a 79year old male lying in his bed, who immediately introduced me to his wife nearby. The couple were very polite and shared a lot of information related to [employer] in the area, various Priests, and many functions happening in the [place]. In our conversation, it was clear that they had strong maury and a close relationship with God. They were coping well with the hospitalization and were eager to discharge after Bridgette Dougherty finished his meal. However, they wanted to talk and share their maury. Intervention-Active listening, words of hope and encouragement.     Outcome- calmness, tato, gratitude, appreciation

## 2021-01-08 NOTE — PROGRESS NOTES
Discharge instructions explained including importance of daily CHG bathing to patient and wife with verbalized understanding. Bottle of CHG soap sent with patient. Patient returning home with wife and all personal belongings.

## 2021-01-11 ENCOUNTER — TELEPHONE (OUTPATIENT)
Dept: FAMILY MEDICINE CLINIC | Age: 71
End: 2021-01-11

## 2021-01-11 NOTE — DISCHARGE SUMMARY
Kathryn Barkley [address] [facility]       Pt Name: Rupinder Benavides  MRN: [id number]  [place]: 1950  Primary Care Physician: Alexa Horowitz MD    Admit date:  1/5/2021  1:28 PM      Discharge date:  1/8/2021  2:52 PM    Admitting Diagnosis: 1. Abdominal pain 2. Symptomatic cholelithiasis     Discharge Diagnosis: 1. Status post laparoscopic cholecystectomy    Admitting Service: General Surgery, Gerardo Kay MD.    Consultants:  None    History and Physical:  Pt Name: Rupinder Benavides  MRN: [id number]  [place]: 1950  Date of evaluation: 1/5/2021  Primary Care Physician: Alexa Horowitz MD  Reason for evaluation: gallstones  IMPRESSIONS:   1. RUQ abdominal pain  2. Symptomatic cholelithiasis  3. Elevated LFTs  4. Nausea & vomiting   5. Hypertension  6. Leukocytosis   7. Acute kidney injury   does not have any pertinent problems on file. PLANS:   1. Admit type: Inpatient  2. It is expected this patient's LOS will be: Greater than 2 midnights  3. Anticipated Disposition Upon Discharge: Home  4. Clear liquids as tolerated  5. MRCP tonight/tomorrow morning  6. Analgesics and antiemetics on a prn basis  7. IV hydration  8. COVID-19 preop  9. Empiric antibiotic coverage  10. DVT prophylaxis with SCD's  11. Home Medications as ordered  12. Labs reviewed. Creat 1.4 and bili 1.8. Repeat labs in am.   15. Admit for pain control and further workup. MRCP likely tomorrow morning. If no choledocholithiasis then cholecystectomy Thursday pending OR schedule. Discussed surgical intervention in detail with patient and wife. Questions answered. Patient would like to proceed with surgery when appropriate. SUBJECTIVE:   Chief Complaint: RUQ pain     History of Present Illness:  Susanna Vazquez is a 76-year old male patient who was a direct admit for symptomatic cholelithiasis. He was in the emergency department yesterday for abdominal pain, nausea & vomiting.  CT imaging and RUQ ultrasound demonstrated distended gallbladder with gallstones. He was discharged yesterday home from the ER without outpatient follow up but once the Morphine wore off at home the pain was too intense. He states yesterday pain was right mid back but now pain is RUQ and radiates to mid abdomen. Pain is constant and aggravating. Not necessarily sharp. Pain worse with inspiration and movement. Morphine helps the pain. Last full meal was Sunday which he had pizza and ice cream. Associated nausea & vomiting. Has not vomited since the ER yesterday. Feels bloated. Appetite poor. Has not eaten today at all. No change in bowel habits. No melena or hematochezia. No fevers or chills. Feels fatigued. No recent EGD or colonoscopy. Has never been told he had gallstones but he has had similar attacks like this in the past. The last was a couple years ago. No urinary complaints. No SOB or chest pain. No lightheadedness or dizziness.      Past Medical History  Past Medical History             Diagnosis Date    Hypertension           Past Surgical History  Past Surgical History             Procedure Laterality Date    SHOULDER SURGERY             Medications  Home Medications           Prior to Admission medications    Medication Sig Start Date End Date Taking? Authorizing Provider   ondansetron (ZOFRAN ODT) 4 MG disintegrating tablet Take 1 tablet by mouth every 8 hours as needed for Nausea or Vomiting 1/4/21     Cole Crain MD   HYDROcodone-acetaminophen [facility]) 5-325 MG per tablet Take 1 tablet by mouth every 6 hours as needed for Pain for up to 5 days.  1/4/21 1/9/21   Cole Crain MD   lisinopril-hydroCHLOROthiazide (PRINZIDE;ZESTORETIC) 20-25 MG per tablet take 1 tablet by mouth twice a day 3/4/20     Chito Jackson MD       Scheduled Meds:  Scheduled Medications    sodium chloride flush  10 mL Intravenous 2 times per day    piperacillin-tazobactam  3.375 g Intravenous Q8H    pantoprazole  40 mg Intravenous Daily     And    sodium chloride (PF)  10 mL Intravenous Daily         Continuous Infusions:  Infusions Meds    sodium chloride           PRN Meds:. PRN Medications   sodium chloride flush, ondansetron **OR** ondansetron, morphine **OR** morphine, HYDROcodone 5 mg - acetaminophen **OR** HYDROcodone 5 mg - acetaminophen, ketorolac     Allergies  has No Known Allergies. Family History  family history includes Heart Disease in his mother. Social History   reports that he has never smoked. He has never used smokeless tobacco. He reports current alcohol use. He reports that he does not use drugs. Review of Systems:  General Denies any fever or chills. No significant unexpected weight change. Change in appetite. HEENT Denies any diplopia, tinnitus or vertigo. No chronic headaches. Resp Denies any shortness of breath, cough or wheezing  Cardiac Denies any chest pain, palpitations, claudication or edema  GI Denies any melena, hematochezia, hematemesis or pyrosis. Positive for RUQ abdominal pain, nausea & vomiting. Abdominal bloating.  Denies any frequency, urgency, hesitancy or incontinence  Heme Denies bruising or bleeding easily  Endocrine Denies any history of diabetes or thyroid disease  Neuro Denies any focal motor or sensory deficits  Musculoskeletal  Denies osteoarthritis. No gout. No weakness. Psychiatric  Denies any severe depression or agitation. No panic attacks. No suicidal ideation. OBJECTIVE:   CURRENT VITALS:  respiration is 22.    Temperature Range (24h):  Temp  Av.7 °F (37.6 °C)  Min: 99.7 °F (37.6 °C)  Max: 99.7 °F (37.6 °C)  BP Range (23F): Systolic (89WQF), BYA:624 , Min:140 , IAR:482     Diastolic (80NUV), JJX:50, Min:63, Max:63     Pulse Range (24h): Pulse  Av  Min: 79  Max: 79  Respiration Range (24h): Resp  Av  Min: 22  Max: 22  Current Pulse Ox (24h):     Pulse Ox Range (24h):  SpO2  Av %  Min: 94 %  Max: 94 %  Oxygen Amount and Delivery: CONSTITUTIONAL: Alert and oriented times 3, no acute distress and cooperative to examination with proper mood and affect. SKIN: Skin color, texture, turgor normal. No rashes or lesions. LYMPH: no cervical nodes, no inguinal nodes  HEENT: Head is normocephalic, atraumatic. NECK: Supple, symmetrical, trachea midline  CHEST/LUNGS:  normal respiratory rate and rhythm, lungs clear to auscultation without wheezes, rales or rhonchi. CARDIOVASCULAR: Heart sounds are normal.  Regular rate and rhythm without murmur, gallop or rub. Normal S1 and S2.  ABDOMEN: Rounded. Softly distended. No and Laparoscopic scar(s) present. Normal bowel sounds. Tenderness: RUQ without guarding or peritoneal signs. RECTAL: deferred, not clinically indicated  NEUROLOGIC: There are no focalizing motor or sensory deficits. CN II-XII are grossly intact. Wendy Profit EXTREMITIES: no cyanosis, no clubbing. Trace edema to lower extremities. LABS:           Recent Labs     01/04/21  0455 01/05/21  1248   WBC 7.0 12.5*   HGB 16.3 15.8   HCT 49.8 47.5    173    135   K 3.6 4.5   CL 99 97*   CO2 26 28   BUN 23* 31*   CREATININE 1.6* 1.4*   CALCIUM  --  10.0   AST 16 27   ALT 23 16   BILITOT 0.8 1.8*   BILIDIR  --  0.4*   AMYLASE  --  67   LIPASE 124.0 27.4      RADIOLOGY:   I have personally reviewed the following films:     PROCEDURE: US GALLBLADDER RUQ       CLINICAL INFORMATION: Gallstones.       COMPARISON: No prior study.       TECHNIQUE:Real-time transabdominal ultrasound was performed.       FINDINGS:       Liver - L= 17.1 cm heterogeneous echogenicity.       Gallbladder - 10.0 x 4.9 x 5.0 cm distended gallbladder. Gallstones. Limited evaluation of the gallbladder neck. Gallbladder Wall - 0.56 cm        Common Duct - 0.60 cm       Rodriguez's Sign: neg            Impression       Distended gallbladder with gallstones.  Gallbladder neck is limited in evaluation.  Correlation with serology is advised.               **This report has been created using voice recognition software. It may contain minor errors which are inherent in voice recognition technology. **       Final report electronically signed by Dr. Leonora Knight on 1/5/2021 12:32 PM      CT abdomen and pelvis with IV contrast       Comparison: None       Findings:   Lung bases show no active disease.  No dependent layering pleural    effusions.  The heart is not enlarged.       Liver normal size and contour.  No focal hepatic lesions.  Patent hepatic    and portal veins.  Slightly over distended gallbladder.  Equivocal    gallstones ultrasound would be confirmatory.  Homogeneous enhancement of    the pancreas.  Calcified splenic granulomas       Normal adrenal glands.  Bilateral fluid attenuating renal cortical lesions    measuring 2.4 cm mid pole left and 2.0 cm mid pole right probable cysts    ultrasound correlation would be confirmatory.  2 mm nonobstructing    calyceal stone mid pole right kidney. Downing Frohlich caliber abdominal aorta.       Bowel demonstrates a nonobstructive pattern.  No free air or fluid or    inflammatory process demonstrated.  Normal appendix and terminal ileum.     Diverticulosis coli without CT evidence of acute diverticulitis.  2 cm    fat-containing umbilical hernia without evidence of omental infarction.     No intraperitoneal or retroperitoneal pelvic or inguinal masses    lymphadenopathy or abnormal fluid collections. Lucho Nailer gland is mildly    enlarged.  Normal distention of the urinary bladder. .       No vertebral body compression fractures or spondylolisthesis.  No bony    destructive lesions.           Impression   Impression:   1.  Borderline over distended gallbladder with equivocal gallstones    ultrasound would be confirmatory. 2.  2 mm nonobstructing calyceal stone mid pole right kidney.  Suspect    renal cortical cysts bilaterally ultrasound would be confirmatory as well.     No evidence of obstructive uropathy.    3.  Normal appendix and terminal updated orders and plan. Labs, cultures, and radiographs where available were reviewed. I discussed patient concerns with the patient's nurse and instructions were given. Please see our orders for the updated patient care plan.     Bowel rest. IV fluid hydration. AM labs. MRCP. IV antibiotics. Pain and nausea control. Pros and cons of cholecystectomy discussed. All questions answered. Await on am labs and MRCP.      Electronically signed by Rosa Galeas MD on 1/6/2021 at 12:50 PM     Procedures/Diagnostic Tests:    EXAMINATION: MRI SCAN ABDOMEN W WO CONTRAST MRCP       HISTORY: 79year-old patient with abdominal pain, gallstones, elevated liver function tests.       TECHNIQUE: MRI scan was carried out through the abdomen before and after intravenous administration of 20 mL PROHANCE. MRCP was performed.       COMPARISON: Ultrasound scan dated 5 January 2021. CT scan dated 4 January 2021.       FINDINGS:       Lung bases: Atelectasis at both lung bases, right greater than left. Small right pleural effusion.       Liver: Mild diffuse fatty replacement of the liver. No superimposed focal hepatic defects.       Gallbladder/biliary system: Multiple gallstones. No gallbladder wall thickening or pericholecystic fluid. The intra and extrahepatic bile ducts are within normal limits. There is no definite evidence for choledocholithiasis.       Spleen: Small accessory spleen in the left upper quadrant. The previously noted granulomata in the spleen are not clearly seen on MRI scan.       Pancreas: Slightly atrophic pancreas. Pancreatic duct is not dilated.       Adrenal glands: Normal.       Kidneys: The previously noted punctate stone in the right kidney seen on CT scan is not clearly seen on MRI scan. There is a right renal cyst measuring 2 cm in size. There is a left renal cyst measuring 1.7 cm in size but is slightly increased signal    intensity in the perinephritic fat bilaterally.       Vascular:  There is normal flow in the abdominal aorta and inferior vena cava and portal vein.       Bowel loops: Small hiatal hernia. Periumbilical hernia containing fat. There is  increased signal intensity surrounding the hepatic flexure of the colon extending into the right paracolic gutter suggestive of inflammatory process. This is more prominent    than on previous CT scan dated 4 January 2021. Please correlate clinically. No significant ascites. No significant lymphadenopathy.       MUSCULOSKELETAL: Lumbar spondylosis.         Impression   1. Mild diffuse fatty replacement in the liver with no superimposed focal hepatic defects. 2. Multiple gallstones. Negative MRCP. No evidence of biliary dilatation or choledocholithiasis. 3. Increased fluid surrounding the hepatic flexure of the colon extending into the right paracolic gutter suggestive of an inflammatory process, more prominent than on previous CT scan dated 4 January 2021. Please correlate clinically. 4. Bilateral renal cysts. Slightly increased signal intensity in the perinephric fat. The previously noted right renal stone seen on CT scan is not clearly seen on MRI scan. 5. Small accessory spleen in the left upper quadrant. The previously noted granulomata in the spleen are not clearly seen on MRI scan. 6. Slightly atrophic pancreas. The pancreatic duct is normal.   7. Small hiatal hernia. Periumbilical hernia containing fat. 8. Atelectasis at both lung bases, right greater than left. Right pleural effusion.    9. Otherwise negative MRI scan  of the abdomen with and without intravenous contrast.        Final report electronically signed by DR Vicky Jacobo on 1/6/2021 2:47 PM     PROCEDURE: US GALLBLADDER RUQ       CLINICAL INFORMATION: Gallstones.       COMPARISON: No prior study.       TECHNIQUE:Real-time transabdominal ultrasound was performed.       FINDINGS:       Liver - L= 17.1 cm heterogeneous echogenicity.       Gallbladder - 10.0 x 4.9 x 5.0 cm distended FINAL DIAGNOSIS:   Gallbladder, cholecystectomy:    Necrotizing acute and chronic cholecystitis. Discharge Instructions:  Pt Name: Melia Crouch Record Number: [id number]  Today's Date: 1/8/2021    GENERAL ANESTHESIA OR SEDATION  1. Do not drive or operate hazardous machinery for 24 hours. 2. Do not make important business or personal decisions for 24 hours. 3. Do not drink alcoholic beverages or use tobacco for 24 hours. ACTIVITY INSTRUCTIONS:  [] Rest today. Resume light to normal activity tomorrow.   [] You may resume normal activity tomorrow. Do not engage in strenuous activity that may place stress on your incision. [x] Do not drive for 3-5 days or while taking the pain medication. Avoid heavy lifting, tugging, pullings greater than 10 lbs until seen in the office. DIET INSTRUCTIONS:  [x]Begin with clear liquids. If not nauseated, may increase to a low-fat diet when you desire. Greasy and spicy foods are not advised. [x]Regular diet as tolerated. MEDICATIONS  [x]Prescription sent with you to be used as directed. []Lortab   [x]Norco   []Percocet   []Tylenol #3   []Oxycontin  Do not drink alcohol or drive while taking these medications. You may experience dizziness or drowsiness with these medications. You may also experience constipation which can be relieved with stool softners or laxatives. **Pain medication at discharge - use only as prescribed- refills may be available to you at your follow up appointments if needed and warranted. Narcotics should be used for only short term and we highly encouraged our patients to wean off appropriately and use other means for pain such as non pharmacologic measures and over the counter tylenol or ibuprofen if no restrictions apply. We do  know that surgical pain is real and will not hesitate to help eliminate some of your discomfort.  However we will not be able to completely make you pain free and it is important to determine what pain level is tolerable for you **  [x]You may resume your daily prescription medication schedule unless otherwise specified. Take the Zofran exactly as prescribed to control nausea and vomiting. Use Colace and MiraLAX asneeded to prevent constipation. Do not allow yourself to become constipated. Drink at least 64 ounces of liquids per day. WOUND/DRESSING INSTRUCTIONS:  Always ensure you and your care giver clean hands before and after caring for the wound. [x] Allow steri-strips to fall off on their own. [x] Ice operative site for 20 minutes 4 times a day as needed. [x] May wash over incision in shower, but do not soak in a bath. ABDOMINAL/LAPAROSCOPIC SURGERY  [x]You are encouraged to get up and move around as this helps with circulation, prevents blood clots from forming and speeds up the healing process. Call the office if you develop pain or swelling in your legs. Do not massage sore muscles in the legs. [x]Breath deeply and cough from time to time. This helps to clear your lungs and helps prevent pneumonia. [x]Supporting your incision with a pillow or your hand helps to minimize discomfort and pain. [x]Laparoscopic patients may develop shoulder pain in the first 48 hours from the gas used during the procedure a heating pad may help alleviate this discomfort. FOLLOW-UP CARE. SPECIFICALLY WATCH FOR:   Fever over 101 degrees by mouth   Increased redness, warmth, hardness at operative site. Blood soaked dressing (small amounts of oozing may be normal.)   Increased or progressive drainage from the surgical area   Inability to urinate or blood in the urine   Pain not relieved by the medications ordered   Persistent nausea and/or vomiting, unable to retain fluids. Pain or swelling in your legs. Shortness of breath. Call the office if you develop any of the above symptoms.      FOLLOW-UP APPOINTMENT   []1 week   [x]2 weeks:    []Other    Call my office if you have any problem that concerns you [phone]. After hours, you can reach the answering service via the office phone number. IF YOU NEED IMMEDIATE ATTENTION, GO TO THE EMERGENCY ROOM AND YOUR DOCTOR WILL BE CONTACTED. Prepared by Orlando Mayorga CNP for   Loulou Reagan MD FACS  [address] [place] [address] [address]    Discharge Medications:        Medication List      START taking these medications    amoxicillin-clavulanate 875-125 MG per tablet  Commonly known as: Augmentin  Take 1 tablet by mouth 2 times daily for 7 days        CONTINUE taking these medications    lisinopril-hydroCHLOROthiazide 20-25 MG per tablet  Commonly known as: PRINZIDE;ZESTORETIC  take 1 tablet by mouth twice a day     ondansetron 4 MG disintegrating tablet  Commonly known as: Zofran ODT  Take 1 tablet by mouth every 8 hours as needed for Nausea or Vomiting        ASK your doctor about these medications    HYDROcodone-acetaminophen 5-325 MG per tablet  Commonly known as: Norco  Take 1 tablet by mouth every 6 hours as needed for Pain for up to 5 days. Ask about: Should I take this medication? Where to Get Your Medications      These medications were sent to [address] [place], [place] [address]  [address] [facility], [place] [address] [address]    Phone: [phone]   · amoxicillin-clavulanate 875-125 MG per tablet         Follow-up:  in the next few weeks with Sigifredo Mesa MD  Follow up with ROSIE Tolentino - [facility] in 1-2 weeks.     Alexa Benavides CNP   Electronincally signed 1/11/2021 at 8:35 AM    A total of 35 minutes was spent in preparing the patient for discharge with greater than 50% of the time involved with education, counseling and coordinating care

## 2021-01-11 NOTE — TELEPHONE ENCOUNTER
[facility] Transitions Initial Follow Up Call    Outreach made within 2 business days of discharge: Yes    Patient: Elva Fonseca Patient : 1950   MRN: [id number]  Reason for Admission: There are no discharge diagnoses documented for the most recent discharge. Discharge Date: 21       Spoke with: Patient wife    Discharge department/facility: [facility]    TCM Interactive Patient Contact:  Was patient able to fill all prescriptions: Yes  Was patient instructed to bring all medications to the follow-up visit: Yes  Is patient taking all medications as directed in the discharge summary?  Yes  Does patient understand their discharge instructions: Yes  Does patient have questions or concerns that need addressed prior to 7-14 day follow up office visit: no    Scheduled appointment with PCP within 7-14 days    Follow Up  Future Appointments   Date Time Provider Daisy Ramos   2021 11:00 AM ROSIE Rao - [phone]

## 2021-01-20 ENCOUNTER — OFFICE VISIT (OUTPATIENT)
Dept: SURGERY | Age: 71
End: 2021-01-20

## 2021-01-20 VITALS
HEIGHT: 73 IN | WEIGHT: 239.4 LBS | RESPIRATION RATE: 18 BRPM | BODY MASS INDEX: 31.73 KG/M2 | DIASTOLIC BLOOD PRESSURE: 80 MMHG | HEART RATE: 76 BPM | OXYGEN SATURATION: 96 % | TEMPERATURE: 97.4 F | SYSTOLIC BLOOD PRESSURE: 130 MMHG

## 2021-01-20 DIAGNOSIS — Z90.49 S/P LAPAROSCOPIC CHOLECYSTECTOMY: Primary | ICD-10-CM

## 2021-01-20 PROCEDURE — 99024 POSTOP FOLLOW-UP VISIT: CPT | Performed by: NURSE PRACTITIONER

## 2021-01-20 ASSESSMENT — ENCOUNTER SYMPTOMS
COUGH: 0
RECTAL PAIN: 0
STRIDOR: 0
WHEEZING: 0
COLOR CHANGE: 0
DIARRHEA: 0
CHEST TIGHTNESS: 0
ANAL BLEEDING: 0
BACK PAIN: 0
ABDOMINAL DISTENTION: 0
NAUSEA: 0
FACIAL SWELLING: 0
VOICE CHANGE: 0
RHINORRHEA: 0
EYE ITCHING: 0
CONSTIPATION: 0
EYE REDNESS: 0
TROUBLE SWALLOWING: 0
EYE DISCHARGE: 0
EYE PAIN: 0
BLOOD IN STOOL: 0
CHOKING: 0
ABDOMINAL PAIN: 0
SHORTNESS OF BREATH: 0
APNEA: 0
SORE THROAT: 0
SINUS PRESSURE: 0
PHOTOPHOBIA: 0
VOMITING: 0

## 2021-01-20 NOTE — PROGRESS NOTES
[facility] [address] [facility] [address]  Dept: [phone]  Dept Fax: [phone]  Loc: [phone]    Visit Date: 1/20/2021    Adelaide Josue is a 79 y.o. male who presents today for:  Chief Complaint   Patient presents with    Post-Op Check     s/p Laparoscopic cholecystectomy-1/7/2021       HPI:     HPI    Ivonne Riggs is a 76-year old male who presents today for follow-up status post laparoscopic cholecystectomy 2 weeks ago. Pathology necrotizing acute and chronic cholecystitis. He has completed his antibiotics. Patient is doing well. Off narcotics. Very minimal incisional soreness. Tolerating regular diet without any nausea or vomiting. Denies any issues with constipation or diarrhea. Taking probiotics. Incisions are healing well without any signs of infection. No fevers or chills. Urinating without difficulties. Denies any shortness of breath or chest pain. Tolerating increased activity well. Past Medical History:   Diagnosis Date    Hypertension       Past Surgical History:   Procedure Laterality Date    CHOLECYSTECTOMY, LAPAROSCOPIC N/A 1/7/2021    LAPAROSCOPIC CHOLECYSTECTOMY performed by Stanley Bobo MD at [address] [place] [address]      cataracts removal     FRACTURE SURGERY  1980s    Right arm     SHOULDER SURGERY         Family History   Problem Relation Age of Onset    Heart Disease Mother        Social History     Tobacco Use    Smoking status: Never Smoker    Smokeless tobacco: Never Used   Substance Use Topics    Alcohol use: Yes     Comment: occassionally      Current Outpatient Medications   Medication Sig Dispense Refill    lisinopril-hydroCHLOROthiazide (PRINZIDE;ZESTORETIC) 20-25 MG per tablet take 1 tablet by mouth twice a day 180 tablet 3     No current facility-administered medications for this visit.       No Known Allergies    Subjective:     Review of Systems Constitutional: Negative for activity change, appetite change, chills, diaphoresis, fatigue, fever and unexpected weight change. HENT: Negative for congestion, dental problem, drooling, ear discharge, ear pain, facial swelling, hearing loss, mouth sores, nosebleeds, postnasal drip, rhinorrhea, sinus pressure, sneezing, sore throat, tinnitus, trouble swallowing and voice change. Eyes: Negative for photophobia, pain, discharge, redness, itching and visual disturbance. Respiratory: Negative for apnea, cough, choking, chest tightness, shortness of breath, wheezing and stridor. Cardiovascular: Negative for chest pain, palpitations and leg swelling. Gastrointestinal: Negative for abdominal distention, abdominal pain, anal bleeding, blood in stool, constipation, diarrhea, nausea, rectal pain and vomiting. Endocrine: Negative for cold intolerance, heat intolerance, polydipsia, polyphagia and polyuria. Genitourinary: Negative for decreased urine volume, difficulty urinating, dysuria, enuresis, flank pain, frequency, genital sores, hematuria and urgency. Musculoskeletal: Negative for arthralgias, back pain, gait problem, joint swelling, myalgias, neck pain and neck stiffness. Skin: Positive for wound (steri strips to abdomen). Negative for color change, pallor and rash. Allergic/Immunologic: Negative for environmental allergies, food allergies and immunocompromised state. Neurological: Negative for dizziness, tremors, seizures, syncope, facial asymmetry, speech difficulty, weakness, light-headedness, numbness and headaches. Hematological: Negative for adenopathy. Does not bruise/bleed easily. Psychiatric/Behavioral: Negative for agitation, behavioral problems, confusion, decreased concentration, dysphoric mood, hallucinations, self-injury, sleep disturbance and suicidal ideas. The patient is not nervous/anxious and is not hyperactive.         Objective: /80 (Site: Right Upper Arm, Position: Sitting, Cuff Size: Medium Adult)   Pulse 76   Temp 97.4 °F (36.3 °C) (Temporal)   Resp 18   Ht 6' 1\" (1.854 m)   Wt 239 lb 6.4 oz (108.6 kg)   SpO2 96%   BMI 31.59 kg/m²     Physical Exam  Vitals signs reviewed. Constitutional:       General: He is not in acute distress. Appearance: Normal appearance. He is well-developed. He is not ill-appearing or toxic-appearing. HENT:      Head: Normocephalic and atraumatic. Right Ear: Hearing and external ear normal.      Left Ear: Hearing and external ear normal.      Nose: Nose normal.      Mouth/Throat:      Mouth: Mucous membranes are not pale, not dry and not cyanotic. Eyes:      General: Lids are normal.   Neck:      Musculoskeletal: Normal range of motion and neck supple. Trachea: Trachea and phonation normal.   Cardiovascular:      Rate and Rhythm: Normal rate and regular rhythm. Pulses: Normal pulses. Heart sounds: S1 normal and S2 normal.   Pulmonary:      Effort: Pulmonary effort is normal. No tachypnea, bradypnea, accessory muscle usage or respiratory distress. Breath sounds: Normal breath sounds. No decreased breath sounds, wheezing or rales. Chest:      Chest wall: No tenderness. Abdominal:      General: Bowel sounds are normal. There is no distension. Palpations: Abdomen is soft. There is no mass. Tenderness: There is no abdominal tenderness. Musculoskeletal: Normal range of motion. General: No tenderness. Skin:     General: Skin is warm and dry. Findings: No abrasion, bruising, burn, ecchymosis, erythema, laceration, lesion or rash. Neurological:      Mental Status: He is alert and oriented to person, place, and time. Motor: No tremor, atrophy or abnormal muscle tone. Coordination: Coordination normal.      Gait: Gait normal.      Deep Tendon Reflexes: Reflexes are normal and symmetric.    Psychiatric: Speech: Speech normal.         Behavior: Behavior normal.         Thought Content: Thought content normal.       Lab Results   Component Value Date    WBC 10.2 01/07/2021    HGB 13.1 (L) 01/08/2021    HCT 40.0 (L) 01/08/2021    MCV 86.9 01/07/2021     (L) 01/07/2021     Lab Results   Component Value Date     01/08/2021    K 4.3 01/08/2021     01/08/2021    CO2 25 01/08/2021    BUN 32 01/08/2021    CREATININE 1.2 01/08/2021    GLUCOSE 156 01/08/2021    CALCIUM 8.8 01/08/2021        PATHOLOGY REPORT     FINAL DIAGNOSIS:   Gallbladder, cholecystectomy:    Necrotizing acute and chronic cholecystitis. Specimen:   GALLBLADDER     Gross Examination:   The container is labeled Anu Arroyo, gallbladder.  Received in   formalin is a gallbladder measuring 6.5 cm in length x 3.5 cm in   diameter.  The surface is hemorrhagic. Yuliana Spoon is a large   surgically-induced defect in the wall.  No lymph nodes are grossly   identified.  There are no stones.  The wall measures 0.3 cm in   thickness and the mucosal surface is hemorrhagic and ragged.  There is   fibrinous debris.  The cystic duct is patent.  Representative sections   including the cystic duct margin are submitted.  1 ss. MTK/DKR:[id number]     Microscopic Examination:   Microscopic examination was performed. Patient Active Problem List   Diagnosis    Essential hypertension     Assessment:     1. Status post laparoscopic cholecystectomy  2. Morbid obesity (BMI 31)    Plan:     1. Abdomen benign. Incisions are healing well without signs of infection. Continue wound care as directed. 2. Completed antibiotics  3. Appetite doing well. Continue diet as tolerated. 4. Bowel function doing well. Stool softeners as needed. 5. Wear abdominal binder for comfort. Off and on as needed throughout the day. 6. Off narcotics. Tylenol as needed for discomfort. 7. Lifting/activity restrictions discussed with patient. Questions answered. 8. Follow up as needed. Signs and symptoms reviewed with patient that would be concerning and need him to return to office for re-evaluation. Patient states he will call if he has questions or concerns.       Electronically signed by ROSIE Thompson CNP on 1/20/2021 at 3:07 PM

## 2021-06-14 RX ORDER — LISINOPRIL AND HYDROCHLOROTHIAZIDE 25; 20 MG/1; MG/1
TABLET ORAL
Qty: 180 TABLET | Refills: 3 | Status: SHIPPED | OUTPATIENT
Start: 2021-06-14 | End: 2022-01-05

## 2022-01-04 ENCOUNTER — NURSE ONLY (OUTPATIENT)
Dept: FAMILY MEDICINE CLINIC | Age: 72
End: 2022-01-04

## 2022-01-04 VITALS
SYSTOLIC BLOOD PRESSURE: 138 MMHG | BODY MASS INDEX: 31.59 KG/M2 | RESPIRATION RATE: 20 BRPM | HEIGHT: 73 IN | DIASTOLIC BLOOD PRESSURE: 86 MMHG

## 2022-01-05 ENCOUNTER — OFFICE VISIT (OUTPATIENT)
Dept: FAMILY MEDICINE CLINIC | Age: 72
End: 2022-01-05
Payer: MEDICARE

## 2022-01-05 VITALS
DIASTOLIC BLOOD PRESSURE: 98 MMHG | TEMPERATURE: 98.1 F | SYSTOLIC BLOOD PRESSURE: 152 MMHG | HEART RATE: 78 BPM | BODY MASS INDEX: 32.98 KG/M2 | WEIGHT: 250 LBS | OXYGEN SATURATION: 96 % | RESPIRATION RATE: 20 BRPM

## 2022-01-05 DIAGNOSIS — H25.9 AGE-RELATED CATARACT OF BOTH EYES, UNSPECIFIED AGE-RELATED CATARACT TYPE: ICD-10-CM

## 2022-01-05 DIAGNOSIS — I10 PRIMARY HYPERTENSION: Primary | ICD-10-CM

## 2022-01-05 PROCEDURE — G8484 FLU IMMUNIZE NO ADMIN: HCPCS | Performed by: NURSE PRACTITIONER

## 2022-01-05 PROCEDURE — G8417 CALC BMI ABV UP PARAM F/U: HCPCS | Performed by: NURSE PRACTITIONER

## 2022-01-05 PROCEDURE — 99214 OFFICE O/P EST MOD 30 MIN: CPT | Performed by: NURSE PRACTITIONER

## 2022-01-05 PROCEDURE — 4040F PNEUMOC VAC/ADMIN/RCVD: CPT | Performed by: NURSE PRACTITIONER

## 2022-01-05 PROCEDURE — 3017F COLORECTAL CA SCREEN DOC REV: CPT | Performed by: NURSE PRACTITIONER

## 2022-01-05 PROCEDURE — 1036F TOBACCO NON-USER: CPT | Performed by: NURSE PRACTITIONER

## 2022-01-05 PROCEDURE — G8427 DOCREV CUR MEDS BY ELIG CLIN: HCPCS | Performed by: NURSE PRACTITIONER

## 2022-01-05 PROCEDURE — 1123F ACP DISCUSS/DSCN MKR DOCD: CPT | Performed by: NURSE PRACTITIONER

## 2022-01-05 RX ORDER — LISINOPRIL AND HYDROCHLOROTHIAZIDE 20; 12.5 MG/1; MG/1
2 TABLET ORAL DAILY
Qty: 180 TABLET | Refills: 1 | Status: SHIPPED | OUTPATIENT
Start: 2022-01-05 | End: 2022-04-07 | Stop reason: SDUPTHER

## 2022-01-05 SDOH — ECONOMIC STABILITY: FOOD INSECURITY: WITHIN THE PAST 12 MONTHS, YOU WORRIED THAT YOUR FOOD WOULD RUN OUT BEFORE YOU GOT MONEY TO BUY MORE.: NEVER TRUE

## 2022-01-05 SDOH — ECONOMIC STABILITY: FOOD INSECURITY: WITHIN THE PAST 12 MONTHS, THE FOOD YOU BOUGHT JUST DIDN'T LAST AND YOU DIDN'T HAVE MONEY TO GET MORE.: NEVER TRUE

## 2022-01-05 ASSESSMENT — PATIENT HEALTH QUESTIONNAIRE - PHQ9
SUM OF ALL RESPONSES TO PHQ QUESTIONS 1-9: 0
2. FEELING DOWN, DEPRESSED OR HOPELESS: 0
SUM OF ALL RESPONSES TO PHQ9 QUESTIONS 1 & 2: 0
1. LITTLE INTEREST OR PLEASURE IN DOING THINGS: 0

## 2022-01-05 ASSESSMENT — SOCIAL DETERMINANTS OF HEALTH (SDOH): HOW HARD IS IT FOR YOU TO PAY FOR THE VERY BASICS LIKE FOOD, HOUSING, MEDICAL CARE, AND HEATING?: NOT HARD AT ALL

## 2022-01-05 NOTE — PROGRESS NOTES
Breonna Hoffmann (:  1950) is a 70 y.o. male,Established patient, here for evaluation of the following chief complaint(s): Other         ASSESSMENT/PLAN:  1. Primary hypertension  2. Age-related cataract of both eyes, unspecified age-related cataract type      Increase lisinopril to 40 mg  Continue with 25 mg of hctz  Monitor pressures  Return next week for recheck  Consider adding norvasc if still needed   Follow up with eye doctor for cataract surgery    Return in about 1 week (around 2022). Subjective   SUBJECTIVE/OBJECTIVE:  HPI    Having suregery for cataracts on the . Needs BP under control. Has been running very high. No cp or sob. Doesn't take meds sometimes. Denies headaches. Review of Systems   Constitutional: Negative for activity change, appetite change, chills, diaphoresis, fatigue, fever and unexpected weight change. HENT: Negative for congestion, ear pain, postnasal drip, sinus pressure and sore throat. Eyes: Negative for visual disturbance. Respiratory: Negative for cough, chest tightness, shortness of breath, wheezing and stridor. Cardiovascular: Negative for chest pain, palpitations and leg swelling. Gastrointestinal: Negative for abdominal distention, abdominal pain, constipation, diarrhea, nausea and vomiting. Endocrine: Negative for polydipsia, polyphagia and polyuria. Genitourinary: Negative for decreased urine volume, difficulty urinating, dysuria, flank pain, frequency, hematuria and urgency. Musculoskeletal: Negative for arthralgias, back pain, gait problem, joint swelling, myalgias and neck pain. Skin: Negative for color change, pallor and rash. Neurological: Negative for dizziness, syncope, weakness, light-headedness, numbness and headaches. Hematological: Negative for adenopathy. Psychiatric/Behavioral: Negative for behavioral problems, self-injury and sleep disturbance. The patient is not nervous/anxious.            Objective Physical Exam  Vitals reviewed. Constitutional:       General: He is not in acute distress. Appearance: Normal appearance. He is well-developed. HENT:      Head: Normocephalic. Right Ear: External ear normal.      Left Ear: External ear normal.      Nose: Nose normal.      Right Sinus: No maxillary sinus tenderness. Left Sinus: No maxillary sinus tenderness. Mouth/Throat:      Pharynx: Uvula midline. Neck:      Trachea: Trachea normal.   Cardiovascular:      Rate and Rhythm: Normal rate and regular rhythm. Heart sounds: Normal heart sounds. No murmur heard. Pulmonary:      Effort: Pulmonary effort is normal. No respiratory distress. Breath sounds: Normal breath sounds. No decreased breath sounds, wheezing, rhonchi or rales. Chest:      Chest wall: No tenderness. Abdominal:      General: There is no distension. Palpations: Abdomen is soft. There is no mass. Tenderness: There is no abdominal tenderness. Musculoskeletal:         General: No tenderness or deformity. Normal range of motion. Cervical back: Normal range of motion and neck supple. Lymphadenopathy:      Cervical: No cervical adenopathy. Skin:     General: Skin is warm and dry. Neurological:      Mental Status: He is alert and oriented to person, place, and time. Motor: No abnormal muscle tone. Coordination: Coordination normal.      Gait: Gait normal.   Psychiatric:         Mood and Affect: Mood normal.         Behavior: Behavior normal.         Thought Content: Thought content normal.         Judgment: Judgment normal.            On this date 1/5/2022 I have spent 31minutes reviewing previous notes, test results and face to face with the patient discussing the diagnosis and importance of compliance with the treatment plan as well as documenting on the day of the visit. An electronic signature was used to authenticate this note.     --ROSIE Patrick - CNP

## 2022-01-10 ENCOUNTER — NURSE ONLY (OUTPATIENT)
Dept: FAMILY MEDICINE CLINIC | Age: 72
End: 2022-01-10

## 2022-01-10 VITALS — DIASTOLIC BLOOD PRESSURE: 78 MMHG | SYSTOLIC BLOOD PRESSURE: 142 MMHG

## 2022-01-10 DIAGNOSIS — I10 PRIMARY HYPERTENSION: Primary | ICD-10-CM

## 2022-01-10 PROCEDURE — 99999 PR OFFICE/OUTPT VISIT,PROCEDURE ONLY: CPT | Performed by: NURSE PRACTITIONER

## 2022-01-10 RX ORDER — AMLODIPINE BESYLATE 2.5 MG/1
2.5 TABLET ORAL DAILY
Qty: 90 TABLET | Refills: 0 | Status: SHIPPED | OUTPATIENT
Start: 2022-01-10 | End: 2022-04-08

## 2022-01-12 ASSESSMENT — ENCOUNTER SYMPTOMS
WHEEZING: 0
NAUSEA: 0
ABDOMINAL DISTENTION: 0
STRIDOR: 0
VOMITING: 0
COLOR CHANGE: 0
CHEST TIGHTNESS: 0
SORE THROAT: 0
DIARRHEA: 0
BACK PAIN: 0
CONSTIPATION: 0
SHORTNESS OF BREATH: 0
SINUS PRESSURE: 0
ABDOMINAL PAIN: 0
COUGH: 0

## 2022-04-07 RX ORDER — LISINOPRIL AND HYDROCHLOROTHIAZIDE 20; 12.5 MG/1; MG/1
2 TABLET ORAL DAILY
Qty: 180 TABLET | Refills: 1 | Status: SHIPPED | OUTPATIENT
Start: 2022-04-07 | End: 2022-09-19

## 2022-04-07 NOTE — TELEPHONE ENCOUNTER
----- Message from Tennis Moots sent at 4/7/2022  1:34 PM EDT -----  Subject: Refill Request    QUESTIONS  Name of Medication? lisinopril-hydroCHLOROthiazide (PRINZIDE;ZESTORETIC)   20-12.5 MG per tablet  Patient-reported dosage and instructions? Take 2 tablets by mouth daily  How many days do you have left? 4  Preferred Pharmacy? [facility] phone number (if available)? [phone]  ---------------------------------------------------------------------------  --------------,  Name of Medication? amLODIPine (NORVASC) 2.5 MG tablet  Patient-reported dosage and instructions? Take 1 tablet by mouth daily  How many days do you have left? 1  Preferred Pharmacy? [facility] phone number (if available)? [phone]  ---------------------------------------------------------------------------  --------------  CALL BACK INFO  What is the best way for the office to contact you? OK to leave message on   voicemail  Preferred Call Back Phone Number? [phone]  ---------------------------------------------------------------------------  --------------  SCRIPT ANSWERS  Relationship to Patient? Other  Representative Name? Wife  Is the Representative on the appropriate HIPAA document in Epic?  Yes

## 2022-04-08 RX ORDER — AMLODIPINE BESYLATE 2.5 MG/1
TABLET ORAL
Qty: 90 TABLET | Refills: 0 | Status: SHIPPED | OUTPATIENT
Start: 2022-04-08 | End: 2022-06-22

## 2022-06-22 RX ORDER — AMLODIPINE BESYLATE 2.5 MG/1
TABLET ORAL
Qty: 90 TABLET | Refills: 1 | Status: SHIPPED | OUTPATIENT
Start: 2022-06-22

## 2022-06-22 NOTE — TELEPHONE ENCOUNTER
Patient's last appointment was : 1/5/2022  Patient's next appointment is : Visit date not found  Last refilled:  04/08/2022

## 2022-09-19 RX ORDER — LISINOPRIL AND HYDROCHLOROTHIAZIDE 20; 12.5 MG/1; MG/1
TABLET ORAL
Qty: 180 TABLET | Refills: 1 | Status: SHIPPED | OUTPATIENT
Start: 2022-09-19

## 2022-12-27 RX ORDER — AMLODIPINE BESYLATE 2.5 MG/1
TABLET ORAL
Qty: 30 TABLET | Refills: 0 | Status: SHIPPED | OUTPATIENT
Start: 2022-12-27

## 2024-09-04 ENCOUNTER — TELEPHONE (OUTPATIENT)
Dept: FAMILY MEDICINE CLINIC | Age: 74
End: 2024-09-04

## 2024-09-04 NOTE — TELEPHONE ENCOUNTER
Malissa called and left a message requesting a refill on Lisinopril/HCTZ.   I called her back and had to leave a message to call the office, patient has not been seen since 1/2022. Wanting to know who has been filling his medication or if he was not taking.

## 2024-09-05 ENCOUNTER — OFFICE VISIT (OUTPATIENT)
Dept: FAMILY MEDICINE CLINIC | Age: 74
End: 2024-09-05
Payer: MEDICARE

## 2024-09-05 VITALS
HEIGHT: 73 IN | WEIGHT: 246 LBS | OXYGEN SATURATION: 97 % | SYSTOLIC BLOOD PRESSURE: 136 MMHG | DIASTOLIC BLOOD PRESSURE: 72 MMHG | RESPIRATION RATE: 18 BRPM | BODY MASS INDEX: 32.6 KG/M2 | HEART RATE: 64 BPM | TEMPERATURE: 98.4 F

## 2024-09-05 DIAGNOSIS — I10 PRIMARY HYPERTENSION: ICD-10-CM

## 2024-09-05 DIAGNOSIS — R73.9 HYPERGLYCEMIA: ICD-10-CM

## 2024-09-05 DIAGNOSIS — Z13.220 SCREENING FOR LIPID DISORDERS: ICD-10-CM

## 2024-09-05 DIAGNOSIS — J01.00 ACUTE NON-RECURRENT MAXILLARY SINUSITIS: ICD-10-CM

## 2024-09-05 DIAGNOSIS — Z00.00 MEDICARE ANNUAL WELLNESS VISIT, SUBSEQUENT: Primary | ICD-10-CM

## 2024-09-05 LAB — HBA1C MFR BLD: 5.7 %

## 2024-09-05 PROCEDURE — G0439 PPPS, SUBSEQ VISIT: HCPCS | Performed by: NURSE PRACTITIONER

## 2024-09-05 PROCEDURE — 99214 OFFICE O/P EST MOD 30 MIN: CPT | Performed by: NURSE PRACTITIONER

## 2024-09-05 PROCEDURE — 3075F SYST BP GE 130 - 139MM HG: CPT | Performed by: NURSE PRACTITIONER

## 2024-09-05 PROCEDURE — G8427 DOCREV CUR MEDS BY ELIG CLIN: HCPCS | Performed by: NURSE PRACTITIONER

## 2024-09-05 PROCEDURE — 3078F DIAST BP <80 MM HG: CPT | Performed by: NURSE PRACTITIONER

## 2024-09-05 PROCEDURE — 1036F TOBACCO NON-USER: CPT | Performed by: NURSE PRACTITIONER

## 2024-09-05 PROCEDURE — G8417 CALC BMI ABV UP PARAM F/U: HCPCS | Performed by: NURSE PRACTITIONER

## 2024-09-05 PROCEDURE — 3017F COLORECTAL CA SCREEN DOC REV: CPT | Performed by: NURSE PRACTITIONER

## 2024-09-05 PROCEDURE — 1123F ACP DISCUSS/DSCN MKR DOCD: CPT | Performed by: NURSE PRACTITIONER

## 2024-09-05 PROCEDURE — 83036 HEMOGLOBIN GLYCOSYLATED A1C: CPT | Performed by: NURSE PRACTITIONER

## 2024-09-05 RX ORDER — AMLODIPINE BESYLATE 2.5 MG/1
2.5 TABLET ORAL DAILY
Qty: 90 TABLET | Refills: 0 | Status: CANCELLED | OUTPATIENT
Start: 2024-09-05 | End: 2024-12-04

## 2024-09-05 RX ORDER — LISINOPRIL AND HYDROCHLOROTHIAZIDE 12.5; 2 MG/1; MG/1
2 TABLET ORAL DAILY
Qty: 180 TABLET | Refills: 3 | Status: SHIPPED | OUTPATIENT
Start: 2024-09-05

## 2024-09-05 RX ORDER — DOXYCYCLINE HYCLATE 100 MG
100 TABLET ORAL 2 TIMES DAILY
Qty: 14 TABLET | Refills: 0 | Status: SHIPPED | OUTPATIENT
Start: 2024-09-05 | End: 2024-09-12

## 2024-09-05 SDOH — ECONOMIC STABILITY: FOOD INSECURITY: WITHIN THE PAST 12 MONTHS, THE FOOD YOU BOUGHT JUST DIDN'T LAST AND YOU DIDN'T HAVE MONEY TO GET MORE.: NEVER TRUE

## 2024-09-05 SDOH — ECONOMIC STABILITY: FOOD INSECURITY: WITHIN THE PAST 12 MONTHS, YOU WORRIED THAT YOUR FOOD WOULD RUN OUT BEFORE YOU GOT MONEY TO BUY MORE.: NEVER TRUE

## 2024-09-05 SDOH — ECONOMIC STABILITY: INCOME INSECURITY: HOW HARD IS IT FOR YOU TO PAY FOR THE VERY BASICS LIKE FOOD, HOUSING, MEDICAL CARE, AND HEATING?: NOT HARD AT ALL

## 2024-09-05 ASSESSMENT — PATIENT HEALTH QUESTIONNAIRE - PHQ9
SUM OF ALL RESPONSES TO PHQ QUESTIONS 1-9: 0
SUM OF ALL RESPONSES TO PHQ QUESTIONS 1-9: 0
1. LITTLE INTEREST OR PLEASURE IN DOING THINGS: NOT AT ALL
2. FEELING DOWN, DEPRESSED OR HOPELESS: NOT AT ALL
SUM OF ALL RESPONSES TO PHQ QUESTIONS 1-9: 0
SUM OF ALL RESPONSES TO PHQ QUESTIONS 1-9: 0
SUM OF ALL RESPONSES TO PHQ9 QUESTIONS 1 & 2: 0

## 2024-09-05 ASSESSMENT — LIFESTYLE VARIABLES
HOW MANY STANDARD DRINKS CONTAINING ALCOHOL DO YOU HAVE ON A TYPICAL DAY: 1 OR 2
HOW OFTEN DO YOU HAVE A DRINK CONTAINING ALCOHOL: MONTHLY OR LESS

## 2024-09-09 ASSESSMENT — ENCOUNTER SYMPTOMS
SINUS PRESSURE: 1
COUGH: 1

## 2024-10-16 ENCOUNTER — LAB (OUTPATIENT)
Dept: LAB | Age: 74
End: 2024-10-16

## 2024-10-16 DIAGNOSIS — Z13.220 SCREENING FOR LIPID DISORDERS: ICD-10-CM

## 2024-10-16 DIAGNOSIS — I10 PRIMARY HYPERTENSION: ICD-10-CM

## 2024-10-16 LAB
ALBUMIN SERPL BCG-MCNC: 4.2 G/DL (ref 3.5–5.1)
ALP SERPL-CCNC: 77 U/L (ref 38–126)
ALT SERPL W/O P-5'-P-CCNC: 16 U/L (ref 11–66)
ANION GAP SERPL CALC-SCNC: 14 MEQ/L (ref 8–16)
AST SERPL-CCNC: 18 U/L (ref 5–40)
BASOPHILS ABSOLUTE: 0 THOU/MM3 (ref 0–0.1)
BASOPHILS NFR BLD AUTO: 0.8 %
BILIRUB SERPL-MCNC: 0.6 MG/DL (ref 0.3–1.2)
BUN SERPL-MCNC: 21 MG/DL (ref 7–22)
CALCIUM SERPL-MCNC: 9.7 MG/DL (ref 8.5–10.5)
CHLORIDE SERPL-SCNC: 102 MEQ/L (ref 98–111)
CHOLEST SERPL-MCNC: 173 MG/DL (ref 100–199)
CO2 SERPL-SCNC: 24 MEQ/L (ref 23–33)
CREAT SERPL-MCNC: 1.2 MG/DL (ref 0.4–1.2)
DEPRECATED RDW RBC AUTO: 40.5 FL (ref 35–45)
EOSINOPHIL NFR BLD AUTO: 4.3 %
EOSINOPHILS ABSOLUTE: 0.2 THOU/MM3 (ref 0–0.4)
ERYTHROCYTE [DISTWIDTH] IN BLOOD BY AUTOMATED COUNT: 12.9 % (ref 11.5–14.5)
GFR SERPL CREATININE-BSD FRML MDRD: 63 ML/MIN/1.73M2
GLUCOSE SERPL-MCNC: 121 MG/DL (ref 70–108)
HCT VFR BLD AUTO: 45.3 % (ref 42–52)
HDLC SERPL-MCNC: 40 MG/DL
HGB BLD-MCNC: 15.1 GM/DL (ref 14–18)
IMM GRANULOCYTES # BLD AUTO: 0.01 THOU/MM3 (ref 0–0.07)
IMM GRANULOCYTES NFR BLD AUTO: 0.3 %
LDLC SERPL CALC-MCNC: 117 MG/DL
LYMPHOCYTES ABSOLUTE: 0.8 THOU/MM3 (ref 1–4.8)
LYMPHOCYTES NFR BLD AUTO: 20.4 %
MCH RBC QN AUTO: 28.7 PG (ref 26–33)
MCHC RBC AUTO-ENTMCNC: 33.3 GM/DL (ref 32.2–35.5)
MCV RBC AUTO: 86 FL (ref 80–94)
MONOCYTES ABSOLUTE: 0.5 THOU/MM3 (ref 0.4–1.3)
MONOCYTES NFR BLD AUTO: 12.8 %
NEUTROPHILS ABSOLUTE: 2.4 THOU/MM3 (ref 1.8–7.7)
NEUTROPHILS NFR BLD AUTO: 61.4 %
NRBC BLD AUTO-RTO: 0 /100 WBC
PLATELET # BLD AUTO: 163 THOU/MM3 (ref 130–400)
PMV BLD AUTO: 10.1 FL (ref 9.4–12.4)
POTASSIUM SERPL-SCNC: 3.9 MEQ/L (ref 3.5–5.2)
PROT SERPL-MCNC: 7.3 G/DL (ref 6.1–8)
RBC # BLD AUTO: 5.27 MILL/MM3 (ref 4.7–6.1)
SODIUM SERPL-SCNC: 140 MEQ/L (ref 135–145)
TRIGL SERPL-MCNC: 80 MG/DL (ref 0–199)
WBC # BLD AUTO: 3.9 THOU/MM3 (ref 4.8–10.8)

## 2025-02-19 ENCOUNTER — APPOINTMENT (OUTPATIENT)
Dept: GENERAL RADIOLOGY | Age: 75
End: 2025-02-19
Payer: MEDICARE

## 2025-02-19 ENCOUNTER — HOSPITAL ENCOUNTER (EMERGENCY)
Age: 75
Discharge: HOME | End: 2025-02-19
Attending: EMERGENCY MEDICINE
Payer: MEDICARE

## 2025-02-19 VITALS
WEIGHT: 240 LBS | BODY MASS INDEX: 31.81 KG/M2 | DIASTOLIC BLOOD PRESSURE: 96 MMHG | HEART RATE: 77 BPM | SYSTOLIC BLOOD PRESSURE: 181 MMHG | TEMPERATURE: 98.4 F | RESPIRATION RATE: 16 BRPM | HEIGHT: 73 IN | OXYGEN SATURATION: 99 %

## 2025-02-19 DIAGNOSIS — S63.502A SPRAIN OF LEFT WRIST, INITIAL ENCOUNTER: Primary | ICD-10-CM

## 2025-02-19 PROCEDURE — 99283 EMERGENCY DEPT VISIT LOW MDM: CPT

## 2025-02-19 PROCEDURE — 73110 X-RAY EXAM OF WRIST: CPT

## 2025-02-19 ASSESSMENT — PAIN SCALES - GENERAL
PAINLEVEL_OUTOF10: 6
PAINLEVEL_OUTOF10: 5

## 2025-02-19 ASSESSMENT — PAIN - FUNCTIONAL ASSESSMENT
PAIN_FUNCTIONAL_ASSESSMENT: 0-10
PAIN_FUNCTIONAL_ASSESSMENT: 0-10

## 2025-02-19 ASSESSMENT — PAIN DESCRIPTION - PAIN TYPE
TYPE: ACUTE PAIN
TYPE: ACUTE PAIN

## 2025-02-19 ASSESSMENT — PAIN DESCRIPTION - ORIENTATION
ORIENTATION: LEFT
ORIENTATION: LEFT

## 2025-02-19 ASSESSMENT — PAIN DESCRIPTION - DESCRIPTORS
DESCRIPTORS: ACHING
DESCRIPTORS: ACHING

## 2025-02-19 ASSESSMENT — PAIN DESCRIPTION - LOCATION
LOCATION: WRIST
LOCATION: WRIST

## 2025-02-19 NOTE — ED NOTES
Pt pink, warm and dry, breathing with ease. RICE encouraged. AVS reviewed. Denies questions or concerns. Pt remains alert and oriented. Pt discharged in stable condition.  Splint intact. Offered to put pt on Dr. STEFFANIE Suazo's schedule but pt didn't think he would need to see him.

## 2025-02-19 NOTE — ED PROVIDER NOTES
[facility] Emergency Department  [address]  [facility] [address]  Phone: [phone]  EMERGENCY DEPARTMENT ENCOUNTER      Pt Name: Ochoa Kaur  MRN: [id number]  Birthdate 1950  Date of evaluation: 2/19/2025  Provider: Neo Martinez MD    CHIEF COMPLAINT       Chief Complaint   Patient presents with    Wrist Injury     Fell backward on the ice injuring left wrist. Denies hitting head. Slight edema noted.          HISTORY OF PRESENT ILLNESS      Ochoa Kaur is a 74 y.o. male who presents to the emergency department with above-noted complaint.  Patient fell backwards hurting wrist.  Complains of pain discomfort distal radius.  Denies other injury        REVIEW OF SYSTEMS     Positive for injury fall  Review of Systems  All systems negative except as marked.     PAST MEDICAL HISTORY     Past Medical History:   Diagnosis Date    Hypertension          SURGICAL HISTORY       Past Surgical History:   Procedure Laterality Date    CHOLECYSTECTOMY, LAPAROSCOPIC N/A 1/7/2021    LAPAROSCOPIC CHOLECYSTECTOMY performed by Watson Hernandez MD at [facility] OR    EYE SURGERY      cataracts removal     FRACTURE SURGERY  1980s    Right arm     SHOULDER SURGERY           CURRENT MEDICATIONS       Previous Medications    LISINOPRIL-HYDROCHLOROTHIAZIDE (PRINZIDE;ZESTORETIC) 20-12.5 MG PER TABLET    Take 2 tablets by mouth daily       ALLERGIES       Patient has no known allergies.    FAMILY HISTORY       Family History   Problem Relation Age of Onset    Heart Disease Mother           SOCIAL HISTORY       Social History     Tobacco Use    Smoking status: Never    Smokeless tobacco: Never   Substance Use Topics    Alcohol use: Yes     Comment: occassionally    Drug use: No         PHYSICAL EXAM           Physical Exam    VITAL SIGNS: Pulse 77   Temp 98.4 °F (36.9 °C) (Temporal)   Resp 16   Ht 1.854 m (6' 1\")   Wt 108.9 kg (240 lb)   SpO2 99%   BMI 31.66 kg/m²    Constitutional:  Alert not toxtic or ill,

## 2025-02-19 NOTE — ED TRIAGE NOTES
Pt fell backward on the ice. C/o left wrist pain, slight edema noted, strong left radial pulse. Denies hitting head or any other pain.

## 2025-02-19 NOTE — DISCHARGE INSTRUCTIONS
Use Tylenol or Motrin for pain.  Wear your splint for comfort.  If continued pain or problems follow-up with [facility] [place].  They have walk-in clinic starting 8 AM Monday through Friday.

## 2025-05-28 RX ORDER — LISINOPRIL AND HYDROCHLOROTHIAZIDE 12.5; 2 MG/1; MG/1
2 TABLET ORAL DAILY
Qty: 180 TABLET | Refills: 0 | Status: SHIPPED | OUTPATIENT
Start: 2025-05-28

## 2025-05-28 NOTE — TELEPHONE ENCOUNTER
This medication refill is regarding a electronic request.  Refill requested by patient.    Requested Prescriptions     Pending Prescriptions Disp Refills    lisinopril-hydroCHLOROthiazide (PRINZIDE;ZESTORETIC) 20-12.5 MG per tablet [Pharmacy Med Name: Lisinopril-hydroCHLOROthiazide 20-12.5 MG Oral Tablet] 180 tablet 0     Sig: Take 2 tablets by mouth once daily       Date of last visit: 9/5/2024  Date of next visit: Visit date not found  Date of last refill: 09/05/2024  Pharmacy Name: [facility]

## (undated) DEVICE — LUER-LOK 360°: Brand: CONNECTA, LUER-LOK

## (undated) DEVICE — INSUFFLATION NEEDLE TO ESTABLISH PNEUMOPERITONEUM.: Brand: INSUFFLATION NEEDLE

## (undated) DEVICE — AGENT HEMSTAT 3GM OXIDIZED REGENERATED CELOS ABSRB FOR CONT (ORDER MULTIPLES OF 5EA)

## (undated) DEVICE — Z DUP USE 2217550 KIT ENDSCPC CLNNG BOWL PURE PRMM WATER BASIN ENZMTC SPNGE DT

## (undated) DEVICE — TROCAR: Brand: KII® SLEEVE

## (undated) DEVICE — 4-PORT MANIFOLD: Brand: NEPTUNE 2

## (undated) DEVICE — SOLUTION SCRB 4OZ 10% PVP I POVIDONE IOD TOP PAINT EXIDINE

## (undated) DEVICE — GLOVE ORTHO 7 1/2   MSG9475

## (undated) DEVICE — TROCAR: Brand: KII FIOS FIRST ENTRY

## (undated) DEVICE — BLANKET WRM W29.9XL79.1IN UP BODY FORC AIR MISTRAL-AIR

## (undated) DEVICE — BANDAGE ADH W1XL3IN NAT FAB WVN FLX DURABLE N ADH PD SEAL

## (undated) DEVICE — KIT,ANTI FOG,W/SPONGE & FLUID,SOFT PACK: Brand: MEDLINE

## (undated) DEVICE — AGENT HEMSTAT W2XL14IN OXIDIZED REGENERATED CELOS ABSRB FOR

## (undated) DEVICE — BLADE CLIPPER GEN PURP NS

## (undated) DEVICE — SURGICEL ENDOSCP APPL

## (undated) DEVICE — APPLICATOR ENDOSCP L34CM W/ S STL CANN PLAS OBT STYL FOR

## (undated) DEVICE — AGENT HEMSTAT W4XL8IN OXIDIZED REGENERATED CELOS ABSRB

## (undated) DEVICE — SUTURE VCRL + SZ 4-0 L27IN ABSRB WHT FS-2 3/8 CIR REV CUT VCP422H

## (undated) DEVICE — GLOVE ORANGE PI 8   MSG9080

## (undated) DEVICE — SOLUTION ANTIFOG VIS SYS CLEARIFY LAPSCP

## (undated) DEVICE — BAG SPEC REM 224ML W4XL6IN DIA10MM 1 HND GYN DISP ENDOPCH

## (undated) DEVICE — AGENT HEMOSTATIC SURGIFLOW MATRIX KIT W/THROMBIN

## (undated) DEVICE — GENERAL LAPAROSCOPY PACK-LF: Brand: MEDLINE INDUSTRIES, INC.

## (undated) DEVICE — YANKAUER,BULB TIP,W/O VENT,RIGID,STERILE: Brand: MEDLINE

## (undated) DEVICE — SOLUTION SURG PREP POV IOD 7.5% 4 OZ

## (undated) DEVICE — GOWN,SIRUS,NON REINFRCD,LARGE,SET IN SL: Brand: MEDLINE

## (undated) DEVICE — APPLIER CLP M L L11.4IN DIA10MM ENDOSCP ROT MULT FOR LIG

## (undated) DEVICE — TROCAR: Brand: KII SHIELDED BLADED ACCESS SYSTEM

## (undated) DEVICE — Z DUPLICATE USE 2431315 SET INSUF TBNG HI FLO W/ SMK EVAC FOR PNEUMOCLEAR

## (undated) DEVICE — PUMP SUC IRR TBNG L10FT W/ HNDPC ASSEMB STRYKEFLOW 2

## (undated) DEVICE — TUBING, SUCTION, 1/4" X 20', STRAIGHT: Brand: MEDLINE INDUSTRIES, INC.

## (undated) DEVICE — LINER SUCT CANSTR 1500CC SEMI RIG W/ POR HYDROPHOBIC SHUT